# Patient Record
Sex: MALE | Race: WHITE | NOT HISPANIC OR LATINO | Employment: OTHER | URBAN - METROPOLITAN AREA
[De-identification: names, ages, dates, MRNs, and addresses within clinical notes are randomized per-mention and may not be internally consistent; named-entity substitution may affect disease eponyms.]

---

## 2020-03-07 ENCOUNTER — APPOINTMENT (OUTPATIENT)
Dept: RADIOLOGY | Facility: MEDICAL CENTER | Age: 80
DRG: 908 | End: 2020-03-07
Attending: HOSPITALIST
Payer: MEDICARE

## 2020-03-07 ENCOUNTER — APPOINTMENT (OUTPATIENT)
Dept: RADIOLOGY | Facility: MEDICAL CENTER | Age: 80
DRG: 908 | End: 2020-03-07
Attending: FAMILY MEDICINE
Payer: MEDICARE

## 2020-03-07 ENCOUNTER — HOSPITAL ENCOUNTER (OUTPATIENT)
Facility: MEDICAL CENTER | Age: 80
End: 2020-03-07

## 2020-03-07 ENCOUNTER — HOSPITAL ENCOUNTER (INPATIENT)
Facility: MEDICAL CENTER | Age: 80
LOS: 5 days | DRG: 908 | End: 2020-03-12
Attending: EMERGENCY MEDICINE | Admitting: HOSPITALIST
Payer: MEDICARE

## 2020-03-07 ENCOUNTER — HOSPITAL ENCOUNTER (OUTPATIENT)
Dept: RADIOLOGY | Facility: MEDICAL CENTER | Age: 80
End: 2020-03-07
Payer: MEDICARE

## 2020-03-07 DIAGNOSIS — R55 SYNCOPE, UNSPECIFIED SYNCOPE TYPE: ICD-10-CM

## 2020-03-07 DIAGNOSIS — K92.2 GASTROINTESTINAL HEMORRHAGE, UNSPECIFIED GASTROINTESTINAL HEMORRHAGE TYPE: ICD-10-CM

## 2020-03-07 PROBLEM — E66.09 CLASS 1 OBESITY DUE TO EXCESS CALORIES WITHOUT SERIOUS COMORBIDITY WITH BODY MASS INDEX (BMI) OF 30.0 TO 30.9 IN ADULT: Status: ACTIVE | Noted: 2020-03-07

## 2020-03-07 PROBLEM — E78.5 DYSLIPIDEMIA: Status: ACTIVE | Noted: 2020-03-07

## 2020-03-07 PROBLEM — N40.0 BPH (BENIGN PROSTATIC HYPERPLASIA): Status: ACTIVE | Noted: 2020-03-07

## 2020-03-07 PROBLEM — Z86.73 HISTORY OF CVA (CEREBROVASCULAR ACCIDENT): Status: ACTIVE | Noted: 2020-03-07

## 2020-03-07 PROBLEM — Z98.890 S/P COLONOSCOPIC POLYPECTOMY: Status: ACTIVE | Noted: 2020-03-07

## 2020-03-07 PROBLEM — I10 ESSENTIAL HYPERTENSION: Status: ACTIVE | Noted: 2020-03-07

## 2020-03-07 LAB
ABO + RH BLD: NORMAL
ABO GROUP BLD: NORMAL
ALBUMIN SERPL BCP-MCNC: 3.3 G/DL (ref 3.2–4.9)
ALBUMIN/GLOB SERPL: 1.4 G/DL
ALP SERPL-CCNC: 62 U/L (ref 30–99)
ALT SERPL-CCNC: 14 U/L (ref 2–50)
ANION GAP SERPL CALC-SCNC: 9 MMOL/L (ref 0–11.9)
AST SERPL-CCNC: 27 U/L (ref 12–45)
BARCODED ABORH UBTYP: 5100
BARCODED ABORH UBTYP: 5100
BARCODED ABORH UBTYP: 9500
BARCODED PRD CODE UBPRD: NORMAL
BARCODED UNIT NUM UBUNT: NORMAL
BASOPHILS # BLD AUTO: 0.3 % (ref 0–1.8)
BASOPHILS # BLD: 0.02 K/UL (ref 0–0.12)
BILIRUB SERPL-MCNC: 0.8 MG/DL (ref 0.1–1.5)
BLD GP AB SCN SERPL QL: NORMAL
BUN SERPL-MCNC: 20 MG/DL (ref 8–22)
CALCIUM SERPL-MCNC: 8.2 MG/DL (ref 8.5–10.5)
CHLORIDE SERPL-SCNC: 106 MMOL/L (ref 96–112)
CO2 SERPL-SCNC: 20 MMOL/L (ref 20–33)
COMPONENT R 8504R: NORMAL
CREAT SERPL-MCNC: 0.78 MG/DL (ref 0.5–1.4)
EOSINOPHIL # BLD AUTO: 0.02 K/UL (ref 0–0.51)
EOSINOPHIL NFR BLD: 0.3 % (ref 0–6.9)
ERYTHROCYTE [DISTWIDTH] IN BLOOD BY AUTOMATED COUNT: 44 FL (ref 35.9–50)
GLOBULIN SER CALC-MCNC: 2.3 G/DL (ref 1.9–3.5)
GLUCOSE BLD-MCNC: 169 MG/DL (ref 65–99)
GLUCOSE SERPL-MCNC: 112 MG/DL (ref 65–99)
HCT VFR BLD AUTO: 21.7 % (ref 42–52)
HCT VFR BLD AUTO: 28.7 % (ref 42–52)
HGB BLD-MCNC: 7.4 G/DL (ref 14–18)
HGB BLD-MCNC: 8.1 G/DL (ref 14–18)
HGB BLD-MCNC: 9.9 G/DL (ref 14–18)
IMM GRANULOCYTES # BLD AUTO: 0.02 K/UL (ref 0–0.11)
IMM GRANULOCYTES NFR BLD AUTO: 0.3 % (ref 0–0.9)
INR BLD: 1
LYMPHOCYTES # BLD AUTO: 1.39 K/UL (ref 1–4.8)
LYMPHOCYTES NFR BLD: 19.5 % (ref 22–41)
MCH RBC QN AUTO: 31.1 PG (ref 27–33)
MCHC RBC AUTO-ENTMCNC: 34.5 G/DL (ref 33.7–35.3)
MCV RBC AUTO: 90.3 FL (ref 81.4–97.8)
MONOCYTES # BLD AUTO: 0.47 K/UL (ref 0–0.85)
MONOCYTES NFR BLD AUTO: 6.6 % (ref 0–13.4)
NEUTROPHILS # BLD AUTO: 5.2 K/UL (ref 1.82–7.42)
NEUTROPHILS NFR BLD: 73 % (ref 44–72)
NRBC # BLD AUTO: 0 K/UL
NRBC BLD-RTO: 0 /100 WBC
PLATELET # BLD AUTO: 155 K/UL (ref 164–446)
PMV BLD AUTO: 9 FL (ref 9–12.9)
POTASSIUM SERPL-SCNC: 4.6 MMOL/L (ref 3.6–5.5)
PRODUCT TYPE UPROD: NORMAL
PROT SERPL-MCNC: 5.6 G/DL (ref 6–8.2)
RBC # BLD AUTO: 3.18 M/UL (ref 4.7–6.1)
RH BLD: NORMAL
SODIUM SERPL-SCNC: 135 MMOL/L (ref 135–145)
UNIT STATUS USTAT: NORMAL
WBC # BLD AUTO: 7.1 K/UL (ref 4.8–10.8)

## 2020-03-07 PROCEDURE — 770020 HCHG ROOM/CARE - TELE (206)

## 2020-03-07 PROCEDURE — 700111 HCHG RX REV CODE 636 W/ 250 OVERRIDE (IP)

## 2020-03-07 PROCEDURE — 04V63DZ RESTRICTION OF RIGHT COLIC ARTERY WITH INTRALUMINAL DEVICE, PERCUTANEOUS APPROACH: ICD-10-PCS | Performed by: RADIOLOGY

## 2020-03-07 PROCEDURE — 85018 HEMOGLOBIN: CPT | Mod: 91

## 2020-03-07 PROCEDURE — A9560 TC99M LABELED RBC: HCPCS

## 2020-03-07 PROCEDURE — 85610 PROTHROMBIN TIME: CPT

## 2020-03-07 PROCEDURE — 80053 COMPREHEN METABOLIC PANEL: CPT

## 2020-03-07 PROCEDURE — 700105 HCHG RX REV CODE 258: Performed by: HOSPITALIST

## 2020-03-07 PROCEDURE — 86901 BLOOD TYPING SEROLOGIC RH(D): CPT

## 2020-03-07 PROCEDURE — 99285 EMERGENCY DEPT VISIT HI MDM: CPT

## 2020-03-07 PROCEDURE — 75774 ARTERY X-RAY EACH VESSEL: CPT

## 2020-03-07 PROCEDURE — 30233N1 TRANSFUSION OF NONAUTOLOGOUS RED BLOOD CELLS INTO PERIPHERAL VEIN, PERCUTANEOUS APPROACH: ICD-10-PCS | Performed by: EMERGENCY MEDICINE

## 2020-03-07 PROCEDURE — 36415 COLL VENOUS BLD VENIPUNCTURE: CPT

## 2020-03-07 PROCEDURE — 82962 GLUCOSE BLOOD TEST: CPT

## 2020-03-07 PROCEDURE — 700102 HCHG RX REV CODE 250 W/ 637 OVERRIDE(OP): Performed by: HOSPITALIST

## 2020-03-07 PROCEDURE — 86900 BLOOD TYPING SEROLOGIC ABO: CPT

## 2020-03-07 PROCEDURE — B4141ZZ FLUOROSCOPY OF SUPERIOR MESENTERIC ARTERY USING LOW OSMOLAR CONTRAST: ICD-10-PCS | Performed by: RADIOLOGY

## 2020-03-07 PROCEDURE — 75726 ARTERY X-RAYS ABDOMEN: CPT | Mod: XU

## 2020-03-07 PROCEDURE — 85025 COMPLETE CBC W/AUTO DIFF WBC: CPT

## 2020-03-07 PROCEDURE — A9270 NON-COVERED ITEM OR SERVICE: HCPCS | Performed by: HOSPITALIST

## 2020-03-07 PROCEDURE — 86850 RBC ANTIBODY SCREEN: CPT

## 2020-03-07 PROCEDURE — 99223 1ST HOSP IP/OBS HIGH 75: CPT | Mod: AI | Performed by: HOSPITALIST

## 2020-03-07 PROCEDURE — 85014 HEMATOCRIT: CPT

## 2020-03-07 PROCEDURE — 700117 HCHG RX CONTRAST REV CODE 255: Performed by: HOSPITALIST

## 2020-03-07 PROCEDURE — 99153 MOD SED SAME PHYS/QHP EA: CPT

## 2020-03-07 RX ORDER — DOXAZOSIN 2 MG/1
2 TABLET ORAL EVERY EVENING
Status: DISCONTINUED | OUTPATIENT
Start: 2020-03-07 | End: 2020-03-12 | Stop reason: HOSPADM

## 2020-03-07 RX ORDER — ATORVASTATIN CALCIUM 10 MG/1
10 TABLET, FILM COATED ORAL DAILY
Status: DISCONTINUED | OUTPATIENT
Start: 2020-03-07 | End: 2020-03-12 | Stop reason: HOSPADM

## 2020-03-07 RX ORDER — ONDANSETRON 4 MG/1
4 TABLET, ORALLY DISINTEGRATING ORAL EVERY 4 HOURS PRN
Status: DISCONTINUED | OUTPATIENT
Start: 2020-03-07 | End: 2020-03-12 | Stop reason: HOSPADM

## 2020-03-07 RX ORDER — BISACODYL 10 MG
10 SUPPOSITORY, RECTAL RECTAL
Status: DISCONTINUED | OUTPATIENT
Start: 2020-03-07 | End: 2020-03-12 | Stop reason: HOSPADM

## 2020-03-07 RX ORDER — POLYETHYLENE GLYCOL 3350 17 G/17G
1 POWDER, FOR SOLUTION ORAL
Status: DISCONTINUED | OUTPATIENT
Start: 2020-03-07 | End: 2020-03-12 | Stop reason: HOSPADM

## 2020-03-07 RX ORDER — MIDAZOLAM HYDROCHLORIDE 1 MG/ML
INJECTION INTRAMUSCULAR; INTRAVENOUS
Status: COMPLETED
Start: 2020-03-07 | End: 2020-03-07

## 2020-03-07 RX ORDER — SODIUM CHLORIDE 9 MG/ML
500 INJECTION, SOLUTION INTRAVENOUS
Status: ACTIVE | OUTPATIENT
Start: 2020-03-07 | End: 2020-03-08

## 2020-03-07 RX ORDER — ACETAMINOPHEN 325 MG/1
650 TABLET ORAL EVERY 6 HOURS PRN
Status: DISCONTINUED | OUTPATIENT
Start: 2020-03-07 | End: 2020-03-12 | Stop reason: HOSPADM

## 2020-03-07 RX ORDER — OXYCODONE HYDROCHLORIDE 5 MG/1
5 TABLET ORAL
Status: DISCONTINUED | OUTPATIENT
Start: 2020-03-07 | End: 2020-03-12 | Stop reason: HOSPADM

## 2020-03-07 RX ORDER — ONDANSETRON 2 MG/ML
4 INJECTION INTRAMUSCULAR; INTRAVENOUS EVERY 4 HOURS PRN
Status: DISCONTINUED | OUTPATIENT
Start: 2020-03-07 | End: 2020-03-12 | Stop reason: HOSPADM

## 2020-03-07 RX ORDER — OXYCODONE HYDROCHLORIDE 5 MG/1
2.5 TABLET ORAL
Status: DISCONTINUED | OUTPATIENT
Start: 2020-03-07 | End: 2020-03-12 | Stop reason: HOSPADM

## 2020-03-07 RX ORDER — CHLORAL HYDRATE 500 MG
2000 CAPSULE ORAL EVERY MORNING
COMMUNITY

## 2020-03-07 RX ORDER — AMOXICILLIN 250 MG
2 CAPSULE ORAL 2 TIMES DAILY
Status: DISCONTINUED | OUTPATIENT
Start: 2020-03-07 | End: 2020-03-12 | Stop reason: HOSPADM

## 2020-03-07 RX ORDER — MIDAZOLAM HYDROCHLORIDE 1 MG/ML
.5-2 INJECTION INTRAMUSCULAR; INTRAVENOUS PRN
Status: ACTIVE | OUTPATIENT
Start: 2020-03-07 | End: 2020-03-08

## 2020-03-07 RX ORDER — ATORVASTATIN CALCIUM 10 MG/1
10 TABLET, FILM COATED ORAL EVERY MORNING
COMMUNITY

## 2020-03-07 RX ORDER — CLOPIDOGREL BISULFATE 75 MG/1
TABLET ORAL DAILY
Status: ON HOLD | COMMUNITY
End: 2020-03-11 | Stop reason: SDUPTHER

## 2020-03-07 RX ORDER — MORPHINE SULFATE 4 MG/ML
2 INJECTION, SOLUTION INTRAMUSCULAR; INTRAVENOUS
Status: DISCONTINUED | OUTPATIENT
Start: 2020-03-07 | End: 2020-03-12 | Stop reason: HOSPADM

## 2020-03-07 RX ORDER — ONDANSETRON 2 MG/ML
4 INJECTION INTRAMUSCULAR; INTRAVENOUS PRN
Status: ACTIVE | OUTPATIENT
Start: 2020-03-07 | End: 2020-03-08

## 2020-03-07 RX ORDER — ONDANSETRON 2 MG/ML
INJECTION INTRAMUSCULAR; INTRAVENOUS
Status: COMPLETED
Start: 2020-03-07 | End: 2020-03-07

## 2020-03-07 RX ORDER — SODIUM CHLORIDE, SODIUM LACTATE, POTASSIUM CHLORIDE, CALCIUM CHLORIDE 600; 310; 30; 20 MG/100ML; MG/100ML; MG/100ML; MG/100ML
INJECTION, SOLUTION INTRAVENOUS CONTINUOUS
Status: DISCONTINUED | OUTPATIENT
Start: 2020-03-07 | End: 2020-03-12 | Stop reason: HOSPADM

## 2020-03-07 RX ADMIN — IOHEXOL 45 ML: 300 INJECTION, SOLUTION INTRAVENOUS at 22:00

## 2020-03-07 RX ADMIN — MIDAZOLAM HYDROCHLORIDE 1 MG: 1 INJECTION INTRAMUSCULAR; INTRAVENOUS at 21:40

## 2020-03-07 RX ADMIN — SODIUM CHLORIDE, POTASSIUM CHLORIDE, SODIUM LACTATE AND CALCIUM CHLORIDE: 600; 310; 30; 20 INJECTION, SOLUTION INTRAVENOUS at 13:53

## 2020-03-07 RX ADMIN — MIDAZOLAM HYDROCHLORIDE 1 MG: 1 INJECTION, SOLUTION INTRAMUSCULAR; INTRAVENOUS at 21:40

## 2020-03-07 RX ADMIN — ONDANSETRON 4 MG: 2 INJECTION INTRAMUSCULAR; INTRAVENOUS at 21:38

## 2020-03-07 RX ADMIN — ATORVASTATIN CALCIUM 10 MG: 10 TABLET, FILM COATED ORAL at 08:49

## 2020-03-07 RX ADMIN — SODIUM CHLORIDE, POTASSIUM CHLORIDE, SODIUM LACTATE AND CALCIUM CHLORIDE: 600; 310; 30; 20 INJECTION, SOLUTION INTRAVENOUS at 07:03

## 2020-03-07 RX ADMIN — FENTANYL CITRATE 25 MCG: 50 INJECTION, SOLUTION INTRAMUSCULAR; INTRAVENOUS at 21:42

## 2020-03-07 ASSESSMENT — PATIENT HEALTH QUESTIONNAIRE - PHQ9
SUM OF ALL RESPONSES TO PHQ9 QUESTIONS 1 AND 2: 0
2. FEELING DOWN, DEPRESSED, IRRITABLE, OR HOPELESS: NOT AT ALL
1. LITTLE INTEREST OR PLEASURE IN DOING THINGS: NOT AT ALL

## 2020-03-07 ASSESSMENT — COPD QUESTIONNAIRES
DURING THE PAST 4 WEEKS HOW MUCH DID YOU FEEL SHORT OF BREATH: NONE/LITTLE OF THE TIME
COPD SCREENING SCORE: 2
HAVE YOU SMOKED AT LEAST 100 CIGARETTES IN YOUR ENTIRE LIFE: NO/DON'T KNOW
DO YOU EVER COUGH UP ANY MUCUS OR PHLEGM?: NO/ONLY WITH OCCASIONAL COLDS OR INFECTIONS
IN THE PAST 12 MONTHS DO YOU DO LESS THAN YOU USED TO BECAUSE OF YOUR BREATHING PROBLEMS: DISAGREE/UNSURE

## 2020-03-07 ASSESSMENT — COGNITIVE AND FUNCTIONAL STATUS - GENERAL
HELP NEEDED FOR BATHING: A LITTLE
TOILETING: A LITTLE
DAILY ACTIVITIY SCORE: 20
CLIMB 3 TO 5 STEPS WITH RAILING: TOTAL
DRESSING REGULAR UPPER BODY CLOTHING: A LITTLE
SUGGESTED CMS G CODE MODIFIER DAILY ACTIVITY: CJ
MOVING TO AND FROM BED TO CHAIR: A LITTLE
WALKING IN HOSPITAL ROOM: A LITTLE
SUGGESTED CMS G CODE MODIFIER MOBILITY: CK
STANDING UP FROM CHAIR USING ARMS: A LITTLE
MOBILITY SCORE: 17
DRESSING REGULAR LOWER BODY CLOTHING: A LITTLE
MOVING FROM LYING ON BACK TO SITTING ON SIDE OF FLAT BED: A LITTLE

## 2020-03-07 ASSESSMENT — LIFESTYLE VARIABLES
DOES PATIENT WANT TO STOP DRINKING: NO
TOTAL SCORE: 0
ON A TYPICAL DAY WHEN YOU DRINK ALCOHOL HOW MANY DRINKS DO YOU HAVE: 0
ALCOHOL_USE: NO
EVER FELT BAD OR GUILTY ABOUT YOUR DRINKING: NO
AVERAGE NUMBER OF DAYS PER WEEK YOU HAVE A DRINK CONTAINING ALCOHOL: 0
CONSUMPTION TOTAL: NEGATIVE
HAVE YOU EVER FELT YOU SHOULD CUT DOWN ON YOUR DRINKING: NO
TOTAL SCORE: 0
EVER HAD A DRINK FIRST THING IN THE MORNING TO STEADY YOUR NERVES TO GET RID OF A HANGOVER: NO
EVER_SMOKED: YES
HOW MANY TIMES IN THE PAST YEAR HAVE YOU HAD 5 OR MORE DRINKS IN A DAY: 0
HAVE PEOPLE ANNOYED YOU BY CRITICIZING YOUR DRINKING: NO
TOTAL SCORE: 0

## 2020-03-07 ASSESSMENT — ENCOUNTER SYMPTOMS
MUSCULOSKELETAL NEGATIVE: 1
EYES NEGATIVE: 1
RESPIRATORY NEGATIVE: 1
CONSTITUTIONAL NEGATIVE: 1
CARDIOVASCULAR NEGATIVE: 1
PSYCHIATRIC NEGATIVE: 1
BLOOD IN STOOL: 1
NEUROLOGICAL NEGATIVE: 1

## 2020-03-07 ASSESSMENT — FIBROSIS 4 INDEX: FIB4 SCORE: 3.63

## 2020-03-07 NOTE — ED NOTES
Patient resting comfortably on stretcher in no acute distress. He is aware that we are still waiting for a bed upstairs. Call bell within reach

## 2020-03-07 NOTE — H&P
Hospital Medicine History & Physical Note    Date of Service  3/7/2020    Primary Care Physician  Pcp Not In Computer    Consultants  None    Code Status  Full code     Chief Complaint  REctal bleed    History of Presenting Illness  80 y.o. male with history of dyslipidemia on statin therapy, essential hypertension which is now controlled after weight loss, and prior CVA on aspirin and Plavix, was in his usual state of health until 4 days prior to admission.  He had a scheduled colonoscopy, which went well, with removal of a polyp, however the next day had a small amount of bleeding.  Per his postoperative paperwork, this was a normal finding, so he did not think.  The following day, he had quite a bit more bleeding than the day prior, and on the day of just prior to admission he had a large amount of blood in the morning.  He denied any abdominal pain, and in fact ate as normal.  He did visit the emergency department, where he was not seen to be bleeding, and labs were taken.  He was then discharged home.  On the night prior to admission, while attempting to use the restroom, he suddenly collapsed to the floor without syncope, and then immediately after awakening, had a large bowel movement which was mostly blood.  He was subsequently taken back to the emergency department, where he apparently was found to have a hemoglobin drop of more than 3 points.  He was subsequently transferred to this facility for need of GI.  He denies headache or vision changes he has no chest pain or shortness of breath, no significant abdominal pain, no diarrhea or constipation.  No other complaints.    Review of Systems  Review of Systems   Constitutional: Negative.    HENT: Negative.    Eyes: Negative.    Respiratory: Negative.    Cardiovascular: Negative.    Gastrointestinal: Positive for blood in stool.   Genitourinary: Negative.    Musculoskeletal: Negative.    Skin: Negative.    Neurological: Negative.    Endo/Heme/Allergies:  Negative.    Psychiatric/Behavioral: Negative.        Past Medical History   has a past medical history of BPH (benign prostatic hyperplasia), Dyslipidemia, and Hypertension.    Surgical History   has a past surgical history that includes shoulder arthroplasty total; cataract phaco with iol (6/5/2012); and cataract phaco with iol (6/19/2012).     Family History  family history includes Cancer in his father and mother.     Social History   reports that he has never smoked. He does not have any smokeless tobacco history on file. He reports that he does not drink alcohol or use drugs.    Allergies  Allergies   Allergen Reactions   • Asa [Aspirin]      Gi bleed       Medications  Prior to Admission Medications   Prescriptions Last Dose Informant Patient Reported? Taking?   atorvastatin (LIPITOR) 10 MG Tab   Yes Yes   Sig: Take 10 mg by mouth every evening.   clopidogrel (PLAVIX) 75 MG Tab   Yes Yes   Sig: Take  by mouth every day.   doxazosin (CARDURA) 2 MG TABS   Yes No   Sig: Take 4 mg by mouth every evening.      Facility-Administered Medications: None       Physical Exam  Temp:  [36.1 °C (97 °F)] 36.1 °C (97 °F)  Pulse:  [78] 78  Resp:  [16] 16  BP: (121)/(63) 121/63  SpO2:  [96 %] 96 %    Physical Exam  Vitals signs and nursing note reviewed.   Constitutional:       General: He is not in acute distress.     Appearance: Normal appearance. He is not ill-appearing.   HENT:      Head: Normocephalic and atraumatic.      Nose: Nose normal.      Mouth/Throat:      Mouth: Mucous membranes are moist.      Pharynx: Oropharynx is clear. No oropharyngeal exudate or posterior oropharyngeal erythema.   Eyes:      Extraocular Movements: Extraocular movements intact.      Conjunctiva/sclera: Conjunctivae normal.      Pupils: Pupils are equal, round, and reactive to light.   Neck:      Musculoskeletal: Normal range of motion and neck supple. No muscular tenderness.   Cardiovascular:      Rate and Rhythm: Normal rate and regular  rhythm.      Pulses: Normal pulses.      Heart sounds: Normal heart sounds. No murmur. No friction rub. No gallop.    Pulmonary:      Effort: Pulmonary effort is normal. No respiratory distress.      Breath sounds: Normal breath sounds. No wheezing, rhonchi or rales.   Abdominal:      General: Abdomen is flat. Bowel sounds are normal. There is no distension.      Palpations: Abdomen is soft. There is no mass.      Tenderness: There is no abdominal tenderness.   Musculoskeletal: Normal range of motion.         General: No swelling or deformity.   Lymphadenopathy:      Cervical: No cervical adenopathy.   Skin:     General: Skin is warm and dry.      Findings: No lesion.   Neurological:      General: No focal deficit present.      Mental Status: He is alert and oriented to person, place, and time.      Cranial Nerves: No cranial nerve deficit.      Motor: No weakness.      Gait: Gait normal.   Psychiatric:         Mood and Affect: Mood normal.         Behavior: Behavior normal.         Laboratory:  Recent Labs     03/07/20  0502   WBC 7.1   RBC 3.18*   HEMOGLOBIN 9.9*   HEMATOCRIT 28.7*   MCV 90.3   MCH 31.1   MCHC 34.5   RDW 44.0   PLATELETCT 155*   MPV 9.0         No results for input(s): ALTSGPT, ASTSGOT, ALKPHOSPHAT, TBILIRUBIN, DBILIRUBIN, GAMMAGT, AMYLASE, LIPASE, ALB, PREALBUMIN, GLUCOSE in the last 72 hours.      No results for input(s): NTPROBNP in the last 72 hours.      No results for input(s): TROPONINT in the last 72 hours.    Urinalysis:    No results found     Imaging:  OUTSIDE IMAGES-CT ABDOMEN /PELVIS   Final Result            Assessment/Plan:  I anticipate this patient will require at least two midnights for appropriate medical management, necessitating inpatient admission.    * GIB (gastrointestinal bleeding)  Assessment & Plan  Suspect due to recent polypectomy, setting of ASA and plavix administration.  Post initiation of transfusion at outside facility.  Check H/H Q6H.  Will need GI consultation  if ongoing bleed.     History of CVA (cerebrovascular accident)  Assessment & Plan  On ASA and Plavix.      BPH (benign prostatic hyperplasia)  Assessment & Plan  Stable on medical regimen.  Monitor.     Class 1 obesity due to excess calories without serious comorbidity with body mass index (BMI) of 30.0 to 30.9 in adult  Assessment & Plan  Body mass index is 30.54 kg/m².      S/P colonoscopic polypectomy  Assessment & Plan  With associated mild bleed, now worsening.  Suspect due to ASA and Plavix.      Dyslipidemia  Assessment & Plan  Continue statin therapy.  Monitor.     Essential hypertension  Assessment & Plan  Controlled after weight loss.  Monitor.       VTE prophylaxis: SCD

## 2020-03-07 NOTE — ASSESSMENT & PLAN NOTE
Continue to have intermittent bleed.  Continue to have chronic blood loss anemia.  Monitor hemoglobin and hematocrit supportive care and transfuse if hemoglobin is less than 7 or hemodynamic instability

## 2020-03-07 NOTE — ED NOTES
Pharmacy Medication Reconciliation      Medication reconciliation updated and complete per pt at bedside  Allergies have been verified and updated   Pt home pharmacy:[facility]    Pt reports he did not take his Plavix for a few days prior to colonoscopy and recently restarted medication on Thursday 03/05/2020    Pt reports that he finished a 10 day course of Clindamycin on 02/29/2020

## 2020-03-07 NOTE — ASSESSMENT & PLAN NOTE
Pt was hypotensive this AM with systolic in the 60s   Started on IVF and also giving a 500 cc bolus  Now slowly improving

## 2020-03-07 NOTE — PROGRESS NOTES
Attending Hospitalist today is Dr. Rapp.  Please call this physician for orders, updates, or questions.

## 2020-03-07 NOTE — ED NOTES
This patient's attending physician will be Dr. Efren Lopez starting at 0700.  Please page him with updates/questions.

## 2020-03-07 NOTE — ASSESSMENT & PLAN NOTE
Patient is status post SMA embolization  Intermittent dark stool   Hemoglobin now has been stable   Transfuse if hemoglobin less than 7 or hemodynamic instability  GI signed off and will need colonoscopy as oupt.

## 2020-03-07 NOTE — ED TRIAGE NOTES
Chief Complaint   Patient presents with   • Bloody Stools     Pt presents as trasnfer from [facility]. Pt presented to the ED with complaitns of bloody stools following a colonoscopy. Pt Hgb was 12 on first assessment, followed by 9. Pt is currently receiving one unit of blood.

## 2020-03-07 NOTE — PROGRESS NOTES
Patient is seen and examined  Hemodynamically stable  Spoke with GI consultant Dr. Vance Moise   Recommended to have Bowel prep for tomorrow  Full liquid diet today  Clear liquid diet tomorrow  Dr. Moise will see patient  Patient's last dose of plavix yesterday   If hypotension or hemodynamically unstable or Hb<7 then transfuse  If acute instability then consider IR consult for embolization

## 2020-03-07 NOTE — ASSESSMENT & PLAN NOTE
Continue statin stop any further anticoagulation including Plavix for now till several weeks outpatient

## 2020-03-07 NOTE — PROGRESS NOTES
Received ED/ED transfer request from [place], [place].  Sending Physician: Marilu  Specialist consulted: NA  Diagnosis Rectal bleeding S/P colonoscopy on 3/4/2020.  Patient Accepted by: PADMINI

## 2020-03-07 NOTE — ED PROVIDER NOTES
ED Provider Note    CHIEF COMPLAINT  Chief Complaint   Patient presents with   • Bloody Stools     Pt presents as trasnfer from [facility]. Pt presented to the ED with complaitns of bloody stools following a colonoscopy. Pt Hgb was 12 on first assessment, followed by 9. Pt is currently receiving one unit of blood.        HPI  Asa Draper is a 80 y.o. male who presents to emergency room today transferred from [facility] after acute GI bleed with syncopal episode.  Patient had colonoscopy performed by Dr. Velásquez on 3/4/2020.  There was a polyp that was removed and had to be cauterized because of bleeding.  He had been taken off his Plavix restarted on Thursday the following day started having bleeding which increased with large amount on Friday.  He had gone to the bathroom and had syncopal episode and appear to be in severe distress secondary to his ongoing GI bleed and went to local emergency room and found to have markedly diminished hemoglobin dropping several points in requiring transfusion.  Denies chest pain no shortness of breath no fever or chills.  He was transferred from [facility] to higher level care severity of his medical condition.  Appear to be in severe distress as above.    REVIEW OF SYSTEMS  See HPI for further details. All other systems are negative.     PAST MEDICAL HISTORY  Past Medical History:   Diagnosis Date   • BPH (benign prostatic hyperplasia)    • Dyslipidemia    • Hypertension        FAMILY HISTORY  [unfilled]    SOCIAL HISTORY  Social History     Socioeconomic History   • Marital status:      Spouse name: Not on file   • Number of children: Not on file   • Years of education: Not on file   • Highest education level: Not on file   Occupational History   • Not on file   Social Needs   • Financial resource strain: Not on file   • Food insecurity     Worry: Not on file     Inability: Not on file   • Transportation needs     Medical: Not on file     Non-medical:  "Not on file   Tobacco Use   • Smoking status: Never Smoker   • Smokeless tobacco: Never Used   Substance and Sexual Activity   • Alcohol use: No   • Drug use: No   • Sexual activity: Not on file   Lifestyle   • Physical activity     Days per week: Not on file     Minutes per session: Not on file   • Stress: Not on file   Relationships   • Social connections     Talks on phone: Not on file     Gets together: Not on file     Attends Yazidism service: Not on file     Active member of club or organization: Not on file     Attends meetings of clubs or organizations: Not on file     Relationship status: Not on file   • Intimate partner violence     Fear of current or ex partner: Not on file     Emotionally abused: Not on file     Physically abused: Not on file     Forced sexual activity: Not on file   Other Topics Concern   • Not on file   Social History Narrative   • Not on file       SURGICAL HISTORY  Past Surgical History:   Procedure Laterality Date   • CATARACT PHACO WITH IOL  6/19/2012    Performed by [facility]EL LUCIO at [facility]   • CATARACT PHACO WITH IOL  6/5/2012    Performed by [facility]EL LUCIO at [facility]   • SHOULDER ARTHROPLASTY TOTAL         CURRENT MEDICATIONS  Home Medications     Reviewed by Clementina Davis R.N. (Registered Nurse) on 03/07/20 at 0455  Med List Status: Partial   Medication Last Dose Status   atorvastatin (LIPITOR) 10 MG Tab  Active   clopidogrel (PLAVIX) 75 MG Tab  Active   doxazosin (CARDURA) 2 MG TABS  Active                ALLERGIES  Allergies   Allergen Reactions   • Asa [Aspirin]      Gi bleed       PHYSICAL EXAM  VITAL SIGNS: /69   Pulse 79   Temp 36.1 °C (97 °F) (Temporal)   Resp 16   Ht 1.702 m (5' 7\")   Wt 88.5 kg (195 lb)   SpO2 96%   BMI 30.54 kg/m²       Constitutional: Well developed, Well nourished, severe distress, Non-toxic appearance.   HENT: Normocephalic, Atraumatic, Bilateral external ears normal, Oropharynx moist, No " oral exudates, Nose normal.   Eyes: PERRLA, EOMI, Conjunctiva normal, No discharge.   Neck: Normal range of motion, No tenderness, Supple, No stridor.   Lymphatic: No lymphadenopathy noted.   Cardiovascular: Normal heart rate, Normal rhythm, No murmurs, No rubs, No gallops.   Thorax & Lungs: Normal breath sounds, No respiratory distress, No wheezing, No chest tenderness.   Abdomen: Bowel sounds normal, Soft, No tenderness, No masses, No pulsatile masses.   Skin: Warm, Dry, No erythema, No rash.   Back: No tenderness, No CVA tenderness.      Rectal: Patient has dark bloody stool.  Extremities: Intact distal pulses, No edema, No tenderness, No cyanosis, No clubbing.   Musculoskeletal: Good range of motion in all major joints. No tenderness to palpation or major deformities noted.   Neurologic: Alert & oriented x 3, Normal motor function, Normal sensory function, No focal deficits noted.   Psychiatric: Affect normal, Judgment normal, Mood normal.     EKG;  Normal sinus rhythm 80 bpm, no ST elevation or depression, no widening of the QRS complex, good R wave progression, IA levels are normal.  This is a twelve-lead EKG is no previous EKG available at this time for comparison.    RADIOLOGY/PROCEDURES  OUTSIDE IMAGES-CT ABDOMEN /PELVIS   Final Result            COURSE & MEDICAL DECISION MAKING  Pertinent Labs & Imaging studies reviewed. (See chart for details)  Patient is anemic from his severe GI bleeding and had syncopal episode secondary to this he was transfused blood IV fluids.  Will be admitted to the hospital placed on cardiac monitor.  Severe distress secondary to the above and ongoing GI bleed.  Discussed case with hospitalist patient admitted as above please see orders for further plan.    FINAL IMPRESSION  1.  Acute gastrointestinal bleeding  2.  Syncopal episode second #1  3.  Severe anemia second #1  4.  Hypertension by history  5.  Critical care time of 39 minutes; this includes multiple conversations with  hospitalist, review records discussion treatment plan, interventions as discussed above.  This does not include any procedure.         Electronically signed by: Charan Walker D.O., 3/7/2020 6:47 AM

## 2020-03-08 LAB
ANION GAP SERPL CALC-SCNC: 6 MMOL/L (ref 0–11.9)
BASOPHILS # BLD AUTO: 0.1 % (ref 0–1.8)
BASOPHILS # BLD: 0.01 K/UL (ref 0–0.12)
BUN SERPL-MCNC: 22 MG/DL (ref 8–22)
CALCIUM SERPL-MCNC: 7.8 MG/DL (ref 8.5–10.5)
CHLORIDE SERPL-SCNC: 109 MMOL/L (ref 96–112)
CO2 SERPL-SCNC: 23 MMOL/L (ref 20–33)
CREAT SERPL-MCNC: 0.75 MG/DL (ref 0.5–1.4)
EOSINOPHIL # BLD AUTO: 0.01 K/UL (ref 0–0.51)
EOSINOPHIL NFR BLD: 0.1 % (ref 0–6.9)
ERYTHROCYTE [DISTWIDTH] IN BLOOD BY AUTOMATED COUNT: 46.4 FL (ref 35.9–50)
GLUCOSE SERPL-MCNC: 142 MG/DL (ref 65–99)
HCT VFR BLD AUTO: 20.6 % (ref 42–52)
HCT VFR BLD AUTO: 21.1 % (ref 42–52)
HGB BLD-MCNC: 6.9 G/DL (ref 14–18)
HGB BLD-MCNC: 6.9 G/DL (ref 14–18)
HGB BLD-MCNC: 7 G/DL (ref 14–18)
HGB BLD-MCNC: 7.4 G/DL (ref 14–18)
HGB BLD-MCNC: 7.6 G/DL (ref 14–18)
IMM GRANULOCYTES # BLD AUTO: 0.03 K/UL (ref 0–0.11)
IMM GRANULOCYTES NFR BLD AUTO: 0.3 % (ref 0–0.9)
LYMPHOCYTES # BLD AUTO: 1.07 K/UL (ref 1–4.8)
LYMPHOCYTES NFR BLD: 12.3 % (ref 22–41)
MCH RBC QN AUTO: 30.4 PG (ref 27–33)
MCHC RBC AUTO-ENTMCNC: 33.2 G/DL (ref 33.7–35.3)
MCV RBC AUTO: 91.7 FL (ref 81.4–97.8)
MONOCYTES # BLD AUTO: 0.61 K/UL (ref 0–0.85)
MONOCYTES NFR BLD AUTO: 7 % (ref 0–13.4)
NEUTROPHILS # BLD AUTO: 6.97 K/UL (ref 1.82–7.42)
NEUTROPHILS NFR BLD: 80.2 % (ref 44–72)
NRBC # BLD AUTO: 0 K/UL
NRBC BLD-RTO: 0 /100 WBC
PLATELET # BLD AUTO: 149 K/UL (ref 164–446)
PMV BLD AUTO: 9.1 FL (ref 9–12.9)
POTASSIUM SERPL-SCNC: 4.3 MMOL/L (ref 3.6–5.5)
RBC # BLD AUTO: 2.3 M/UL (ref 4.7–6.1)
SODIUM SERPL-SCNC: 138 MMOL/L (ref 135–145)
WBC # BLD AUTO: 8.7 K/UL (ref 4.8–10.8)

## 2020-03-08 PROCEDURE — 99232 SBSQ HOSP IP/OBS MODERATE 35: CPT | Performed by: FAMILY MEDICINE

## 2020-03-08 PROCEDURE — 80048 BASIC METABOLIC PNL TOTAL CA: CPT

## 2020-03-08 PROCEDURE — 700105 HCHG RX REV CODE 258: Performed by: HOSPITALIST

## 2020-03-08 PROCEDURE — A9270 NON-COVERED ITEM OR SERVICE: HCPCS | Performed by: HOSPITALIST

## 2020-03-08 PROCEDURE — 700105 HCHG RX REV CODE 258: Performed by: INTERNAL MEDICINE

## 2020-03-08 PROCEDURE — 85018 HEMOGLOBIN: CPT | Mod: 91

## 2020-03-08 PROCEDURE — 85014 HEMATOCRIT: CPT

## 2020-03-08 PROCEDURE — 770020 HCHG ROOM/CARE - TELE (206)

## 2020-03-08 PROCEDURE — P9016 RBC LEUKOCYTES REDUCED: HCPCS

## 2020-03-08 PROCEDURE — 86923 COMPATIBILITY TEST ELECTRIC: CPT | Mod: 91

## 2020-03-08 PROCEDURE — 36430 TRANSFUSION BLD/BLD COMPNT: CPT

## 2020-03-08 PROCEDURE — 700102 HCHG RX REV CODE 250 W/ 637 OVERRIDE(OP): Performed by: HOSPITALIST

## 2020-03-08 PROCEDURE — 85025 COMPLETE CBC W/AUTO DIFF WBC: CPT

## 2020-03-08 PROCEDURE — 36415 COLL VENOUS BLD VENIPUNCTURE: CPT

## 2020-03-08 RX ORDER — SODIUM CHLORIDE 9 MG/ML
INJECTION, SOLUTION INTRAVENOUS CONTINUOUS
Status: ACTIVE | OUTPATIENT
Start: 2020-03-08 | End: 2020-03-09

## 2020-03-08 RX ADMIN — ATORVASTATIN CALCIUM 10 MG: 10 TABLET, FILM COATED ORAL at 04:58

## 2020-03-08 RX ADMIN — SODIUM CHLORIDE: 9 INJECTION, SOLUTION INTRAVENOUS at 23:23

## 2020-03-08 RX ADMIN — SODIUM CHLORIDE, POTASSIUM CHLORIDE, SODIUM LACTATE AND CALCIUM CHLORIDE: 600; 310; 30; 20 INJECTION, SOLUTION INTRAVENOUS at 12:47

## 2020-03-08 ASSESSMENT — PATIENT HEALTH QUESTIONNAIRE - PHQ9
1. LITTLE INTEREST OR PLEASURE IN DOING THINGS: NOT AT ALL
2. FEELING DOWN, DEPRESSED, IRRITABLE, OR HOPELESS: NOT AT ALL
SUM OF ALL RESPONSES TO PHQ9 QUESTIONS 1 AND 2: 0

## 2020-03-08 ASSESSMENT — ENCOUNTER SYMPTOMS
PALPITATIONS: 0
PSYCHIATRIC NEGATIVE: 1
EYES NEGATIVE: 1
RESPIRATORY NEGATIVE: 1
NEUROLOGICAL NEGATIVE: 1
CHILLS: 0
PHOTOPHOBIA: 0
MUSCULOSKELETAL NEGATIVE: 1
BLOOD IN STOOL: 1
FEVER: 0
DOUBLE VISION: 0
BLURRED VISION: 0
CARDIOVASCULAR NEGATIVE: 1

## 2020-03-08 NOTE — OR SURGEON
Immediate Post- Operative Note        PostOp Diagnosis: cecal bleeding      Procedure(s): arteriogram with coil embolization of distal RIGHT colic branch of SMA    Tammy Ferreira      Estimated Blood Loss: Less than 5 ml        Complications: None            3/7/2020     10:29 PM     Devan Madera M.D.

## 2020-03-08 NOTE — CONSULTS
Date of Consultation:  3/7/2020    Patient: : Asa Draper  MRN: [id number]    Referring Physician:  Huber Del Valle M.D.    GI:Vance Moise M.D.     Reason for Consultation: Rectal bleeding     History of Present Illness:      80 y.o. male with history of prior CVA on aspirin and Plavix, was in his usual state of health until 4 days prior to admission.    He had a colonoscopy on 3/4/20 :     A single flat 35 mm polyp of benign appearance was found in the ascending colon. A piece-meal polypectomy was performed using a hot snare over a saline pillow. The polyp was completely removed.     He resumed his antiplatelets on the day of the colonoscopy and started having some rectal bleeding, since the day after the colonoscopy. He had a syncopal events yesterday followed by a large bloody bowel movement. In the ER his hb seemed to be considerably low from prior         8/3/2011 03:53 3/7/2020 05:02 3/7/2020 14:47   Hemoglobin 12.5 (L) 9.9 (L) 8.1 (L)   Hematocrit 36.2 (L) 28.7 (L)          he underwent arteriogram with coil embolization of distal RIGHT colic branch of SMA  On 3/7/20     3/8/20: No BM yet. No abd pain . Off plavix      Past Medical History:   Diagnosis Date   • BPH (benign prostatic hyperplasia)    • Dyslipidemia    • Hypertension          Past Surgical History:   Procedure Laterality Date   • CATARACT PHACO WITH IOL  6/19/2012    Performed by ARIEL LUCIO at [facility]   • CATARACT PHACO WITH IOL  6/5/2012    Performed by [facility] LUCIO at [facility]   • SHOULDER ARTHROPLASTY TOTAL         Family History   Problem Relation Age of Onset   • Cancer Mother    • Cancer Father        Social History     Socioeconomic History   • Marital status:      Spouse name: Not on file   • Number of children: Not on file   • Years of education: Not on file   • Highest education level: Not on file   Occupational History   • Not on file   Social Needs   • Financial  resource strain: Not on file   • Food insecurity     Worry: Not on file     Inability: Not on file   • Transportation needs     Medical: Not on file     Non-medical: Not on file   Tobacco Use   • Smoking status: Never Smoker   • Smokeless tobacco: Never Used   Substance and Sexual Activity   • Alcohol use: No   • Drug use: No   • Sexual activity: Not on file   Lifestyle   • Physical activity     Days per week: Not on file     Minutes per session: Not on file   • Stress: Not on file   Relationships   • Social connections     Talks on phone: Not on file     Gets together: Not on file     Attends Pentecostalism service: Not on file     Active member of club or organization: Not on file     Attends meetings of clubs or organizations: Not on file     Relationship status: Not on file   • Intimate partner violence     Fear of current or ex partner: Not on file     Emotionally abused: Not on file     Physically abused: Not on file     Forced sexual activity: Not on file   Other Topics Concern   • Not on file   Social History Narrative   • Not on file       Review of Systems   Constitutional: Positive for malaise/fatigue.   HENT: Negative.    Eyes: Negative.    Respiratory: Negative.    Cardiovascular: Negative.    Gastrointestinal: Positive for blood in stool.   Musculoskeletal: Negative.    Skin: Negative.    Neurological: Negative.    Endo/Heme/Allergies: Negative.    Psychiatric/Behavioral: Negative.          Physical Exam:  Vitals:    03/07/20 0701 03/07/20 0801 03/07/20 1200 03/07/20 1300   BP: (!) 99/61 102/65 (!) 97/62    Pulse: 79 69 77    Resp: 16 (!) 28 18    Temp:   36.8 °C (98.2 °F)    TempSrc:   Temporal    SpO2: 96% 94% 96%    Weight:    87.1 kg (192 lb 0.3 oz)   Height:           Physical Exam   Constitutional: He is oriented to person, place, and time and well-developed, well-nourished, and in no distress.   HENT:   Head: Atraumatic.   Eyes: Pupils are equal, round, and reactive to light.   Pale conjunctiva     Neck: Normal range of motion. Neck supple.   Cardiovascular: Normal rate and regular rhythm.   Pulmonary/Chest: Effort normal and breath sounds normal.   Abdominal: Soft. Bowel sounds are normal.   Musculoskeletal: Normal range of motion.   Neurological: He is alert and oriented to person, place, and time.         Labs:  Recent Labs     03/07/20  0502 03/07/20  1447   WBC 7.1  --    RBC 3.18*  --    HEMOGLOBIN 9.9* 8.1*   HEMATOCRIT 28.7*  --    MCV 90.3  --    MCH 31.1  --    MCHC 34.5  --    RDW 44.0  --    PLATELETCT 155*  --    MPV 9.0  --      Recent Labs     03/07/20  0502   SODIUM 135   POTASSIUM 4.6   CHLORIDE 106   CO2 20   GLUCOSE 112*   BUN 20             Imaging:  Bleeding scan : 3/7/20     IMPRESSION:    1.  Findings are consistent with active GI bleeding within the large bowel at the level of the cecum.      Impressions:    1. Lower GI bleed   2. Colon polyps   3.H/o EMR at      Recommendations:     1. Advance diet as tolerated   2. Trend Hb/Hct ( His hb is stable at 7-7.5gm% , he may benefit from a unit of PRBC )   3. If his hb stable without any overt bleeding he can be Dced home  4.Hold Plavix for 7-10 days, and follow with PCP to re-evaluate the need for plavix   5.As per the patient he is on Plavix for h/o stroke ( 7/18/2019)         This note was generated using voice recognition software which has a small chance of producing errors of grammar and possibly content. I have made every reasonable attempt to find and correct any obvious errors, but expect that some may not be found prior to finalization of this note.

## 2020-03-08 NOTE — PROGRESS NOTES
Patient returned from IR.  Alert and oriented x4, able to answer questions and follow commands.  Right groin puncture site, clean dry and in tact.  Pt instructed to lay flat post operatively for 2 hours.  Pt compliant.     Updated Dr. Ferreira upon return to floor - patient to remain NPO until further notice.  Be attentive to pain mgmt due to risk of necrotic bowel.  RN to call physician back if pain occurs.  No bowel prep needed at this time.

## 2020-03-08 NOTE — CARE PLAN
Problem: Communication  Goal: The ability to communicate needs accurately and effectively will improve  Outcome: PROGRESSING AS EXPECTED  Intervention: Educate patient and significant other/support system about the plan of care, procedures, treatments, medications and allow for questions  Note: Here for active GIB. Plan is for Bowel Prep tomorrow. Colonoscopy Monday. Emergent Embolization if needed before then. Patient demonstrating good understanding.     Problem: Safety  Goal: Will remain free from falls  Outcome: PROGRESSING SLOWER THAN EXPECTED  Intervention: Assess risk factors for falls  Note: Here for active GIB. Patient assisted to floor when ambulating to bathroom with LISA Reed. Vital signs obtained. BP: 71/40. Patient put back to bed and Trendelenburged. Post fall management deferred to night shift. Patient verbalizing feeling dizzy, but otherwise stable.

## 2020-03-08 NOTE — CARE PLAN
Problem: Venous Thromboembolism (VTW)/Deep Vein Thrombosis (DVT) Prevention:  Goal: Patient will participate in Venous Thrombosis (VTE)/Deep Vein Thrombosis (DVT)Prevention Measures  Intervention: Encourage patient to perform ankle flex, foot rotation, and knee flex exercises in addition to other prophylatic measures every hour while awake  Note: SCD in use for dvt prophylaxis, encouraged pt to perform ankle flex and knee flex exercises, pt acknowledged.     Problem: Pain Management  Goal: Pain level will decrease to patient's comfort goal  Intervention: Follow pain managment plan developed in collaboration with patient and Interdisciplinary Team  Note: Pain assessed, pt declined, resting and reposition implemented, pt tolerated well.

## 2020-03-08 NOTE — PROGRESS NOTES
Repeat hgb and hct to be drawn at midnight.   said to follow protocol for transfusion - awaiting labs to be drawn.

## 2020-03-08 NOTE — PROGRESS NOTES
OVERNIGHT HOSPITALIST:    Received critical results for the NM-GI bleeding scan:    IMPRESSION:     1.  Findings are consistent with active GI bleeding within the large bowel at the level of the cecum.      -Ordered STAT H&H and Hemoglobin is now 7.4  -Paged GI and discussed results with Dr. Moise over the phone. He is recommending blood transfusion and to refer to IR for possible embolization due to active bleeding  -Paged IR and discussed with IR on call Dr. Madera  -Nurse was updated about the plan

## 2020-03-08 NOTE — PROGRESS NOTES
Case reviewed with the hospital ist on call   Active oozing from prior polypectomy site in the AC   Hb: 7.4, with labile BP   Suggest IR embolization

## 2020-03-08 NOTE — CARE PLAN
Problem: Safety  Goal: Will remain free from falls  Outcome: PROGRESSING AS EXPECTED    -Pt bedrest currently following IR procedure. Educated to ring for assistance.     Problem: Pain Management  Goal: Pain level will decrease to patient's comfort goal  Outcome: PROGRESSING AS EXPECTED    -Pt has no c/o pain. Resting comfortably in bed.

## 2020-03-08 NOTE — PROGRESS NOTES
Monitor Summary: SR 96, NH .22, QRS .08, QT .32 with First Degree Heart Block per strip from monitor room.

## 2020-03-08 NOTE — PROGRESS NOTES
Mesenteric arteriogram with embolization of GDA performed by Dr Madera via right femoral access. Procedure BARs explained to patient by physician and consent obtained. Patient to IR 2 and assisted to table. Patient was monitored and assessed continuously throughout procedure; ETCO2 27/30 with consistent waveform. Procedure completed without complication. Right groin  puncture closed with angioseal and site CDI; sterile guaze/tegaderm dressing applied. Patient tolerated procedure quite well, was slightly drowsy but appropriately responsive afterward. Patient returned to his room in good condition.     [facility]F vascular closure ref [id number]  Lot [id number]

## 2020-03-08 NOTE — PROGRESS NOTES
[facility] Medicine Daily Progress Note    Date of Service  3/8/2020    Chief Complaint  80 y.o. male admitted 3/7/2020 with GI bleed    Hospital Course    Patient is a 80-year-old male with prior history of stroke on antiplatelet therapy with Plavix who underwent for outpatient elective colonoscopy on 3/4/2020 done by Dr. Velásquez, and continued taking Plavix on subsequent following days admitted here for GI bleed and anemia.  Patient continued to have GI bleeding and hemoglobin drop from 9.9 on admission to 7.7 with hypotension and active GI bleed.  Patient required 1 unit of transfusion.  Patient underwent for emergency IR angiogram and underwent for embolization of SMA.  After embolization patient has stable post procedure course and does not have any more bleeding.      Interval Problem Update  Patient denies any bowel movement or active bleeding.  Patient states that he feels weak and tired but symptoms are stable not worsening.  Weakness and tiredness has been going on for the last 48 hours.  Weakness was worse due to active GI bleed.    Consultants/Specialty  Interventional radiology- Dr. Madera  Gastroenterology - Dr. Moise     Code Status  Full code    Disposition  Home in 1-2 days     Review of Systems  Review of Systems   Constitutional: Negative for chills and fever.   HENT: Negative for hearing loss and tinnitus.    Eyes: Negative for blurred vision, double vision and photophobia.   Cardiovascular: Negative for chest pain and palpitations.   Genitourinary: Negative for dysuria, frequency and urgency.   Skin: Negative for itching.   All other systems reviewed and are negative.       Physical Exam  Temp:  [36.6 °C (97.9 °F)-37.1 °C (98.7 °F)] 36.6 °C (97.9 °F)  Pulse:  [77-97] 81  Resp:  [7-19] 16  BP: ()/(40-68) 107/66  SpO2:  [88 %-98 %] 97 %    Physical Exam  Constitutional:       General: He is not in acute distress.     Appearance: Normal appearance. He is not ill-appearing, toxic-appearing or  diaphoretic.   HENT:      Head: Normocephalic.      Nose: Nose normal. No congestion or rhinorrhea.      Mouth/Throat:      Pharynx: No oropharyngeal exudate or posterior oropharyngeal erythema.   Eyes:      General: No scleral icterus.     Pupils: Pupils are equal, round, and reactive to light.   Neck:      Musculoskeletal: Normal range of motion. No neck rigidity or muscular tenderness.      Vascular: No carotid bruit.   Cardiovascular:      Rate and Rhythm: Normal rate and regular rhythm.      Heart sounds: No murmur. No friction rub.   Pulmonary:      Effort: Pulmonary effort is normal. No respiratory distress.      Breath sounds: Normal breath sounds. No stridor. No wheezing or rhonchi.   Abdominal:      General: Abdomen is flat. There is no distension.      Palpations: Abdomen is soft. There is no mass.      Tenderness: There is no abdominal tenderness.      Hernia: No hernia is present.   Musculoskeletal:         General: No swelling, tenderness, deformity or signs of injury.   Lymphadenopathy:      Cervical: No cervical adenopathy.   Skin:     General: Skin is warm.   Neurological:      General: No focal deficit present.      Mental Status: He is alert and oriented to person, place, and time. Mental status is at baseline.      Sensory: No sensory deficit.      Coordination: Coordination normal.         Fluids    Intake/Output Summary (Last 24 hours) at 3/8/2020 1128  Last data filed at 3/8/2020 0000  Gross per 24 hour   Intake --   Output 50 ml   Net -50 ml       Laboratory  Recent Labs     03/07/20  0502  03/07/20  1950 03/08/20  0145 03/08/20  0729   WBC 7.1  --   --  8.7  --    RBC 3.18*  --   --  2.30*  --    HEMOGLOBIN 9.9*   < > 7.4* 7.0* 7.6*   HEMATOCRIT 28.7*  --  21.7* 21.1*  --    MCV 90.3  --   --  91.7  --    MCH 31.1  --   --  30.4  --    MCHC 34.5  --   --  33.2*  --    RDW 44.0  --   --  46.4  --    PLATELETCT 155*  --   --  149*  --    MPV 9.0  --   --  9.1  --     < > = values in this  interval not displayed.     Recent Labs     03/07/20  0502 03/08/20  0145   SODIUM 135 138   POTASSIUM 4.6 4.3   CHLORIDE 106 109   CO2 20 23   GLUCOSE 112* 142*   BUN 20 22   CREATININE 0.78 0.75   CALCIUM 8.2* 7.8*                   Imaging  NM-GI BLEEDING SCAN   Final Result      1.  Findings are consistent with active GI bleeding within the large bowel at the level of the cecum.      These findings were discussed with nurse garrick on 03/07/2020.      OUTSIDE IMAGES-CT ABDOMEN /PELVIS   Final Result      IR-VISCERAL ANGIOGRAM - SELECTIVE    (Results Pending)        Assessment/Plan  * GIB (gastrointestinal bleeding)  Assessment & Plan  Patient is status post SMA embolization for emergency GI bleeding overnight.  Stable hemoglobin after 1 unit  Will start patient on full liquid diet  Monitor hemoglobin and hematocrit every 6 hours  Discussed with patient's GI consultant and interventional radiologist  Appreciate GI consult and recommendations.    History of CVA (cerebrovascular accident)  Assessment & Plan  Continue statin stop any further anticoagulation including Plavix for now till several weeks outpatient    BPH (benign prostatic hyperplasia)  Assessment & Plan  Stable on medical regimen.  Monitor.     S/P colonoscopic polypectomy  Assessment & Plan  Discontinue any blood thinners.  Patient has a prior history of stroke.  Considering patient has hypotension and severe GI bleed I am concerned that risk of bleeding are higher  And the polyp pathology diagnosis is pending as of now.  Risk-benefit analysis was done with patient and gastroenterology consultants and it is concluded that patient should be off from Plavix until further recommendations are made in outpatient setting    Dyslipidemia  Assessment & Plan  Continue statin therapy.  Monitor.     Essential hypertension  Assessment & Plan  Patient is actually hypotensive after GI bleed and currently blood pressures are stable.  Avoid any blood pressure  medications for now continue to monitor       VTE prophylaxis: SCDs only, pharmacological anticoagulation is contraindicated

## 2020-03-08 NOTE — PROGRESS NOTES
Radiologist Dr. Chong called RN during shift change, RN did state that she has not received report on the patient yet.  Dr. Chong proceeded to tell RN that the patient has an acute GI bleed from the cecum.  Dr. Ferreira paged.     Dr. Chong from radiology called with critical result of active GI bleed from the cecum. Critical lab result read back to Dr. Chong.  Dr. Ferreira notified of critical lab result at 1925.  Critical radiology result read back by Dr. Ferreira.

## 2020-03-09 ENCOUNTER — APPOINTMENT (OUTPATIENT)
Dept: CARDIOLOGY | Facility: MEDICAL CENTER | Age: 80
DRG: 908 | End: 2020-03-09
Attending: INTERNAL MEDICINE
Payer: MEDICARE

## 2020-03-09 ENCOUNTER — TELEPHONE (OUTPATIENT)
Dept: CARDIOLOGY | Facility: MEDICAL CENTER | Age: 80
End: 2020-03-09

## 2020-03-09 DIAGNOSIS — R00.2 PALPITATIONS: ICD-10-CM

## 2020-03-09 DIAGNOSIS — I49.1 PAC (PREMATURE ATRIAL CONTRACTION): Chronic | ICD-10-CM

## 2020-03-09 PROBLEM — R00.1 SINUS BRADYCARDIA: Status: ACTIVE | Noted: 2020-03-09

## 2020-03-09 LAB
EKG IMPRESSION: NORMAL
HCT VFR BLD AUTO: 22.9 % (ref 42–52)
HGB BLD-MCNC: 6.9 G/DL (ref 14–18)
HGB BLD-MCNC: 7 G/DL (ref 14–18)
HGB BLD-MCNC: 7.7 G/DL (ref 14–18)
HGB BLD-MCNC: 8.2 G/DL (ref 14–18)
LV EJECT FRACT  99904: 60
LV EJECT FRACT MOD 2C 99903: 66.62
LV EJECT FRACT MOD 4C 99902: 60.43
LV EJECT FRACT MOD BP 99901: 62.16

## 2020-03-09 PROCEDURE — 85014 HEMATOCRIT: CPT

## 2020-03-09 PROCEDURE — 700102 HCHG RX REV CODE 250 W/ 637 OVERRIDE(OP): Performed by: HOSPITALIST

## 2020-03-09 PROCEDURE — 85018 HEMOGLOBIN: CPT

## 2020-03-09 PROCEDURE — 99233 SBSQ HOSP IP/OBS HIGH 50: CPT | Performed by: FAMILY MEDICINE

## 2020-03-09 PROCEDURE — P9016 RBC LEUKOCYTES REDUCED: HCPCS

## 2020-03-09 PROCEDURE — 99223 1ST HOSP IP/OBS HIGH 75: CPT | Performed by: INTERNAL MEDICINE

## 2020-03-09 PROCEDURE — 93306 TTE W/DOPPLER COMPLETE: CPT

## 2020-03-09 PROCEDURE — A9270 NON-COVERED ITEM OR SERVICE: HCPCS | Performed by: HOSPITALIST

## 2020-03-09 PROCEDURE — 93306 TTE W/DOPPLER COMPLETE: CPT | Mod: 26 | Performed by: INTERNAL MEDICINE

## 2020-03-09 PROCEDURE — 36430 TRANSFUSION BLD/BLD COMPNT: CPT

## 2020-03-09 PROCEDURE — 770020 HCHG ROOM/CARE - TELE (206)

## 2020-03-09 PROCEDURE — 36415 COLL VENOUS BLD VENIPUNCTURE: CPT

## 2020-03-09 PROCEDURE — 93010 ELECTROCARDIOGRAM REPORT: CPT | Performed by: INTERNAL MEDICINE

## 2020-03-09 PROCEDURE — 93005 ELECTROCARDIOGRAM TRACING: CPT | Performed by: FAMILY MEDICINE

## 2020-03-09 PROCEDURE — 86923 COMPATIBILITY TEST ELECTRIC: CPT

## 2020-03-09 RX ADMIN — ACETAMINOPHEN 650 MG: 325 TABLET, FILM COATED ORAL at 22:27

## 2020-03-09 RX ADMIN — ATORVASTATIN CALCIUM 10 MG: 10 TABLET, FILM COATED ORAL at 04:06

## 2020-03-09 ASSESSMENT — ENCOUNTER SYMPTOMS
ORTHOPNEA: 0
CHILLS: 0
HEADACHES: 1
PALPITATIONS: 0
PHOTOPHOBIA: 0
DOUBLE VISION: 0
HEADACHES: 0
FEVER: 0
BLURRED VISION: 0
DIZZINESS: 0

## 2020-03-09 ASSESSMENT — PATIENT HEALTH QUESTIONNAIRE - PHQ9
2. FEELING DOWN, DEPRESSED, IRRITABLE, OR HOPELESS: NOT AT ALL
1. LITTLE INTEREST OR PLEASURE IN DOING THINGS: NOT AT ALL
SUM OF ALL RESPONSES TO PHQ9 QUESTIONS 1 AND 2: 0

## 2020-03-09 NOTE — PROGRESS NOTES
Gastroenterology Progress Note     Author: Valeriy Velásquez M.D.   Date & Time Created: 3/9/2020 10:29 AM    Chief Complaint:  Gi bleeding    Interval History:  Patient with post-polypectomy bleeding from very large cecal polyp removal now s/p IR embolization with seeming control of bleeding. However this am has hypotension but no bowel movements. HgBs appear stable but is getting one unit of blood now. No abd pain no CP or lightheadedness.    Review of Systems:  Review of Systems   Constitutional: Positive for malaise/fatigue. Negative for chills and fever.   Cardiovascular: Negative for chest pain.   Neurological: Positive for headaches.       Physical Exam:  Physical Exam  Constitutional:       Appearance: Normal appearance. He is not ill-appearing.   HENT:      Head: Normocephalic and atraumatic.      Mouth/Throat:      Mouth: Mucous membranes are moist.   Eyes:      General: No scleral icterus.     Extraocular Movements: Extraocular movements intact.   Cardiovascular:      Rate and Rhythm: Normal rate and regular rhythm.   Pulmonary:      Breath sounds: Normal breath sounds.   Abdominal:      General: Abdomen is flat.      Palpations: Abdomen is soft.      Tenderness: There is no guarding or rebound.   Musculoskeletal:         General: No swelling.   Skin:     General: Skin is warm and dry.   Neurological:      Mental Status: He is alert.   Psychiatric:         Mood and Affect: Mood normal.         Behavior: Behavior normal.         Labs:          Recent Labs     03/07/20  0502 03/08/20  0145   SODIUM 135 138   POTASSIUM 4.6 4.3   CHLORIDE 106 109   CO2 20 23   BUN 20 22   CREATININE 0.78 0.75   CALCIUM 8.2* 7.8*     Recent Labs     03/07/20  0502 03/08/20  0145   ALTSGPT 14  --    ASTSGOT 27  --    ALKPHOSPHAT 62  --    TBILIRUBIN 0.8  --    GLUCOSE 112* 142*     Recent Labs     03/07/20  0502  03/07/20  1950 03/08/20  0145  03/08/20  1939 03/08/20  2215 03/09/20  0536 03/09/20  0755   RBC 3.18*  --   --   2.30*  --   --   --   --   --    HEMOGLOBIN 9.9*   < > 7.4* 7.0*   < > 6.9* 6.9* 6.9* 7.0*   HEMATOCRIT 28.7*  --  21.7* 21.1*  --  20.6*  --   --   --    PLATELETCT 155*  --   --  149*  --   --   --   --   --     < > = values in this interval not displayed.     Recent Labs     20  0502 20  0145   WBC 7.1 8.7   NEUTSPOLYS 73.00* 80.20*   LYMPHOCYTES 19.50* 12.30*   MONOCYTES 6.60 7.00   EOSINOPHILS 0.30 0.10   BASOPHILS 0.30 0.10   ASTSGOT 27  --    ALTSGPT 14  --    ALKPHOSPHAT 62  --    TBILIRUBIN 0.8  --      Hemodynamics:  Temp (24hrs), Av.9 °C (98.5 °F), Min:36.4 °C (97.6 °F), Max:37.5 °C (99.5 °F)  Temperature: 36.8 °C (98.2 °F)  Pulse  Av.5  Min: 58  Max: 97   Blood Pressure : (!) 98/57     Respiratory:    Respiration: 17, Pulse Oximetry: 97 %        RUL Breath Sounds: Clear, RML Breath Sounds: Clear, RLL Breath Sounds: Diminished, PRABHU Breath Sounds: Clear, LLL Breath Sounds: Diminished  Fluids:    Intake/Output Summary (Last 24 hours) at 3/9/2020 1029  Last data filed at 3/9/2020 0800  Gross per 24 hour   Intake 1575 ml   Output 2050 ml   Net -475 ml        GI/Nutrition:  Orders Placed This Encounter   Procedures   • Diet Order Clear Liquid (no red)     Standing Status:   Standing     Number of Occurrences:   1     Order Specific Question:   Diet:     Answer:   Clear Liquid [10]     Comments:   no red     Medical Decision Making, by Problem:  Active Hospital Problems    Diagnosis   • *GIB (gastrointestinal bleeding) [K92.2]   • Essential hypertension [I10]   • Dyslipidemia [E78.5]   • S/P colonoscopic polypectomy [Z98.890]   • History of CVA (cerebrovascular accident) [Z86.73]     Impression and Plan    1) Lower GI bleeding - From postpolypectomy site. Now with some hypotension and concern for recurrent bleeding. Will check HgBs q6 after transfusion but I think he can have clear liquids for now. Discussed with him options from here if shows signs of rebleeding (HgBs blood in stool etc)  which would include prepping for colonoscopy vs repeat IR embolization vs surgical resection. Reviewed him that given the size and high risk nature of this polyp proceeding to surgical resection is not unreasonable as he will at least need one or 2 repeat colonoscopies in the coming year to confirm removal of this very large polyp given piecemeal resection and surgical removal may still be needed in the coming year. Will discuss further with him if needed.  2) Anemia, acute  3) Hypotension. Says his BP's tend to run low.  4) Anticoagulation therapy - hold anticoagulants for now.    Will follow    EMO      Quality-Core Measures

## 2020-03-09 NOTE — PROGRESS NOTES
Monitor tech notified pt experienced HR 48 for 2 min and the 2.8 sec pause. Updated to MD. Rapp. New order received: ECG

## 2020-03-09 NOTE — PROGRESS NOTES
HGB 6.9, pt asymptomatic. MD notified. Order received to transfuse I unit PRBCs, see blood administration charting for details.

## 2020-03-09 NOTE — ASSESSMENT & PLAN NOTE
First-degree AV block  Frequent pauses with the highest 3.5 sec asymptomatic  Followed by cardiology appreciate rec.  Cardiology will arrange for zio patch as outpt

## 2020-03-09 NOTE — PROGRESS NOTES
MS: rhythm: SR/ST and First degree HB with PAC and occasional pauses 1.3 sec ot 1.45 and , HR , ND 0.26/QRS0.08/QT0.36

## 2020-03-09 NOTE — PROGRESS NOTES
Monitor tech notified pt experienced second degree type 1. Paged MD. Dionte, waiting for MD call back

## 2020-03-09 NOTE — CARE PLAN
Problem: Safety  Goal: Will remain free from injury  Intervention: Provide assistance with mobility  Note: Assisted pt up to bathroom, SBA with FWW, pt tolerated well , free from infury     Problem: Venous Thromboembolism (VTW)/Deep Vein Thrombosis (DVT) Prevention:  Goal: Patient will participate in Venous Thrombosis (VTE)/Deep Vein Thrombosis (DVT)Prevention Measures  Intervention: Ensure patient wears graduated elastic stockings (JAYLA hose) and/or SCDs, if ordered, when in bed or chair (Remove at least once per shift for skin check)  Flowsheets (Taken 3/9/2020 0800)  Mechanical Prophylaxis: SCDs, Sequential Compression Device  Note: Pt has active bleeding, SCD implemented for mechanical prophylaxis

## 2020-03-09 NOTE — CONSULTS
Reason for Consult:  Asked by Dr Precious Rapp M.D. to see this patient with pauses in heart rhythm  Patient's PCP: Pcp Not In Computer    CC:   Chief Complaint   Patient presents with   • Bloody Stools     Pt presents as [facility]hanfer from [facility]. Pt presented to the ED with complaitns of bloody stools following a colonoscopy. Pt Hgb was 12 on first assessment, followed by 9. Pt is currently receiving one unit of blood.        HPI: Very pleasant 80-year-old gentleman who presents with a history of GI bleeds recently had colonoscopy and apparently was commented he may have a AV block type II he has been evaluated here and underwent emergent embolization in this setting he has been on telemetry and was noted to have intermittent pauses in his heart beat up to 2.8 seconds on review of the telemetry strips these are blocked PACs with normal resumption of sinus rhythm after the pauses.  Previously had heart evaluation remotely in 2011 here at [facility] was normal he was taking antiplatelet    Medications / Drug list prior to admission:  No current facility-administered medications on file prior to encounter.      Current Outpatient Medications on File Prior to Encounter   Medication Sig Dispense Refill   • atorvastatin (LIPITOR) 10 MG Tab Take 10 mg by mouth every morning.     • clopidogrel (PLAVIX) 75 MG Tab Take  by mouth every day.     • Omega-3 Fatty Acids (FISH OIL) 1000 MG Cap capsule Take 2,000 mg by mouth every morning.     • CLINDAMYCIN HCL PO Take 1 Cap by mouth 4 times a day. 10 day course     • doxazosin (CARDURA) 2 MG TABS Take 2 mg by mouth every evening.         Current list of administered Medications:    Current Facility-Administered Medications:   •  atorvastatin (LIPITOR) tablet 10 mg, 10 mg, Oral, DAILY, Huber Del Valle M.D., 10 mg at 03/09/20 0406  •  doxazosin (CARDURA) tablet 2 mg, 2 mg, Oral, Q EVENING, Huber Del Valle M.D., Stopped at 03/07/20 1800  •  acetaminophen (TYLENOL) tablet 650 mg,  650 mg, Oral, Q6HRS PRN, Huber Del Valle M.D.  •  Notify provider if pain remains uncontrolled, , , CONTINUOUS **AND** Use the numeric rating scale (NRS-11) on regular floors and Critical-Care Pain Observation Tool (CPOT) on ICUs/Trauma to assess pain, , , CONTINUOUS **AND** Pulse Ox (Oximetry), , , CONTINUOUS **AND** Pharmacy Consult Request ...Pain Management Review 1 Each, 1 Each, Other, PHARMACY TO DOSE **AND** If patient difficult to arouse and/or has respiratory depression, stop any opiates that are currently infusing and call a Rapid Response., , , CONTINUOUS **AND** oxyCODONE immediate-release (ROXICODONE) tablet 2.5 mg, 2.5 mg, Oral, Q3HRS PRN **AND** oxyCODONE immediate-release (ROXICODONE) tablet 5 mg, 5 mg, Oral, Q3HRS PRN **AND** morphine (pf) 4 MG/ML injection 2 mg, 2 mg, Intravenous, Q3HRS PRN, Huber Del Valle M.D.  •  ondansetron (ZOFRAN) syringe/vial injection 4 mg, 4 mg, Intravenous, Q4HRS PRN, Huber Del Valle M.D.  •  ondansetron (ZOFRAN ODT) dispertab 4 mg, 4 mg, Oral, Q4HRS PRN, Huber Del Valel M.D.  •  senna-docusate (PERICOLACE or SENOKOT S) 8.6-50 MG per tablet 2 Tab, 2 Tab, Oral, BID, Stopped at 03/07/20 0600 **AND** polyethylene glycol/lytes (MIRALAX) PACKET 1 Packet, 1 Packet, Oral, QDAY PRN **AND** magnesium hydroxide (MILK OF MAGNESIA) suspension 30 mL, 30 mL, Oral, QDAY PRN **AND** bisacodyl (DULCOLAX) suppository 10 mg, 10 mg, Rectal, QDAY PRN, Huber Del Valle M.D.  •  lactated ringers infusion, , Intravenous, Continuous, Huber Del Valle M.D., Last Rate: 75 mL/hr at 03/09/20 0300    Past Medical History:   Diagnosis Date   • BPH (benign prostatic hyperplasia)    • Dyslipidemia    • Hypertension        Past Surgical History:   Procedure Laterality Date   • CATARACT PHACO WITH IOL  6/19/2012    Performed by ARIEL LUCIO at [facility]   • CATARACT PHACO WITH IOL  6/5/2012    Performed by ARIEL LUCIO at [facility]   • SHOULDER ARTHROPLASTY TOTAL          Family History   Problem Relation Age of Onset   • Cancer Mother    • Stroke Mother    • Cancer Father    • Arrythmia Father         pacemaker     Patient family history was personally reviewed, no pertinent family history to current presentation    Social History     Tobacco Use   • Smoking status: Never Smoker   • Smokeless tobacco: Never Used   Substance Use Topics   • Alcohol use: No   • Drug use: No       ALLERGIES:  Allergies   Allergen Reactions   • Asa [Aspirin] Unspecified     Gi bleed       Review of systems:  A complete review of symptoms was reviewed with patient. This is reviewed in H&P and PMH. ALL OTHERS reviewed and negative    Physical exam:  Patient Vitals for the past 24 hrs:   BP Temp Temp src Pulse Resp SpO2   03/09/20 1200 103/56 36.8 °C (98.2 °F) Temporal 67 16 96 %   03/09/20 1015 (!) 98/57 36.8 °C (98.2 °F) Temporal (!) 58 17 97 %   03/09/20 0945 (!) 99/55 36.8 °C (98.3 °F) Temporal (!) 59 17 95 %   03/09/20 0912 100/54 36.8 °C (98.2 °F) Temporal 64 18 95 %   03/09/20 0800 (!) 96/50 36.4 °C (97.6 °F) Temporal 65 18 97 %   03/09/20 0415 (!) 99/58 -- -- -- -- --   03/09/20 0400 (!) 96/51 37.2 °C (99 °F) Temporal 66 16 97 %   03/09/20 0030 109/67 36.9 °C (98.5 °F) -- 70 12 99 %   03/09/20 0000 106/65 36.9 °C (98.4 °F) -- 67 14 90 %   03/08/20 2315 131/79 37 °C (98.6 °F) -- 75 16 97 %   03/08/20 2302 108/64 36.9 °C (98.5 °F) -- 88 14 92 %   03/08/20 2000 101/57 37.2 °C (99 °F) Temporal 73 16 94 %   03/08/20 1837 (!) 97/64 -- -- -- -- --   03/08/20 1600 101/61 37.5 °C (99.5 °F) Temporal 87 16 98 %     General: No acute distress.   EYES: no jaundice  HEENT: OP clear   Neck: No bruits No JVD.   CVS:   RRR. S1 + S2. No M/R/G  Resp: CTAB. No wheezing or crackles/rhonchi.  Abdomen: Soft, NT, ND,  Skin: Grossly nothing acute no obvious rashes  Neurological: Alert, Moves all extremities, no cranial nerve defects on limited exam  Extremities: Pulse 2+ in b/l LE.  no edema. No cyanosis.        Data:  Laboratory studies personally reviewed by me:  Recent Results (from the past 24 hour(s))   HGB (Hemoglobin) for 48 hours    Collection Time: 20  1:58 PM   Result Value Ref Range    Hemoglobin 7.4 (L) 14.0 - 18.0 g/dL   HEMOGLOBIN AND HEMATOCRIT    Collection Time: 20  7:39 PM   Result Value Ref Range    Hemoglobin 6.9 (L) 14.0 - 18.0 g/dL    Hematocrit 20.6 (L) 42.0 - 52.0 %   HGB (Hemoglobin) for 48 hours    Collection Time: 20 10:15 PM   Result Value Ref Range    Hemoglobin 6.9 (L) 14.0 - 18.0 g/dL   HGB (Hemoglobin) for 48 hours    Collection Time: 20  5:36 AM   Result Value Ref Range    Hemoglobin 6.9 (L) 14.0 - 18.0 g/dL   HGB    Collection Time: 20  7:55 AM   Result Value Ref Range    Hemoglobin 7.0 (L) 14.0 - 18.0 g/dL   EKG    Collection Time: 20 12:24 PM   Result Value Ref Range    Report       [facility]    Test Date:  2020  Pt Name:    MIGUE OBREGON              Department: [facility]  MRN:        [id number]                      Room:       S180  Gender:     Male                         Technician: RUST  :        1940                   Requested By:KAMERON RUIZ  Order #:    [id number]                    Reading MD: Gaby Lieberman    Measurements  Intervals                                Axis  Rate:       55                           P:          -22  TX:         298                          QRS:        26  QRSD:       99                           T:          52  QT:         430  QTc:        412    Interpretive Statements  Probably wandering pacemaker with bradycardia  SINUS PAUSE  PROLONGED TX INTERVAL  Early R wave progression  No previous ECG available for comparison  Electronically Signed On 3-9-2020 12:34:42 PDT by Gaby Lieberman         Imaging:  IR-VISCERAL ANGIOGRAM - SELECTIVE   Final Result      Successful coil embolization of a peripheral branch of a RIGHT colic artery            NM-GI BLEEDING SCAN   Final Result      1.  Findings  are consistent with active GI bleeding within the large bowel at the level of the cecum.      These findings were discussed with nurse garrick on 03/07/2020.      OUTSIDE IMAGES-CT ABDOMEN /PELVIS   Final Result      EC-ECHOCARDIOGRAM COMPLETE W/O CONT    (Results Pending)           EKG : personally reviewed by me sinus rhythm with blocked PAC    Telemetry shows occasional blocked PAC he did have 2 PACs in a row which resulted in a 2.8 second pause but normal resumption of his sinus rhythm    All pertinent features of laboratory and imaging reviewed including primary images where applicable      Principal Problem:    GIB (gastrointestinal bleeding) POA: Unknown  Active Problems:    Essential hypertension POA: Unknown    Dyslipidemia POA: Unknown    S/P colonoscopic polypectomy POA: Unknown    History of CVA (cerebrovascular accident) POA: Unknown    Sinus bradycardia POA: Unknown    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 *      Assessment / Plan:  His pauses are blocked PACs no evidence of arrhythmia worse than this his recent dropped beats in the setting of colonoscopy likely reflect the same as in setting of severe illness requiring transfusion and urgent embolization he did not have any worse than this    We can arrange for a 2-week ZIO patch monitor  We will get an echocardiogram    He would like to establish with Dr. Cavazos in [place] for long-term follow-up    He can be discharged safely from a cardiovascular perspective    I personally discussed his case with  Dr Precious Rapp M.D.    No future appointments.    It is my pleasure to participate in the care of Mr. Draper.  Please do not hesitate to contact me with questions or concerns.    Brett Rudolph MD PhD [facility]  Cardiologist [facility]    3/9/2020    Please note that this dictation was created using voice  recognition software. I have worked with consultants from the vendor as well as technical experts from [facility] to optimize the interface. I have made every reasonable attempt to correct obvious errors, but I expect that there are errors of grammar and possibly content I did not discover before finalizing the note.

## 2020-03-09 NOTE — PROGRESS NOTES
Pt wants to take his doxazosin to help him urinating easily. However, his BP 97/64, will recheck pt;s BP and reschedule to med to 2000

## 2020-03-09 NOTE — PROGRESS NOTES
Hospital Medicine Daily Progress Note    Date of Service  3/9/2020    Chief Complaint  80 y.o. male admitted 3/7/2020 with GI bleed    Hospital Course    Patient is a 80-year-old male with prior history of stroke on antiplatelet therapy with Plavix who underwent for outpatient elective colonoscopy on 3/4/2020 done by Dr. Velásquez, and continued taking Plavix on subsequent following days admitted here for GI bleed and anemia.  Patient continued to have GI bleeding and hemoglobin drop from 9.9 on admission to 7.7 with hypotension and active GI bleed.  Patient required 1 unit of transfusion.  Patient underwent for emergency IR angiogram and underwent for embolization of SMA.  Patient has intermittent bleeding.  Patient has been seen by gastroenterologist in the hospital.  Plan is to continue monitoring hemoglobin and hematocrit and supportive care with transfusion.      Interval Problem Update  Patient had 6 some bloody stool yesterday.  Patient denies lightheadedness or dizziness.  Patient denies any chest pain.    Consultants/Specialty  Interventional radiology- Dr. Madera  Gastroenterology - Dr. Moise and Dr. Velásquez  Cardiology-Dr. Rudolph    Code Status  Full code    Disposition     Review of Systems  Review of Systems   Constitutional: Negative for chills and fever.   HENT: Negative for hearing loss and tinnitus.    Eyes: Negative for blurred vision, double vision and photophobia.   Cardiovascular: Negative for chest pain, palpitations and orthopnea.   Genitourinary: Negative for dysuria, frequency and urgency.   Skin: Negative for itching.   Neurological: Negative for dizziness and headaches.   All other systems reviewed and are negative.       Physical Exam  Temp:  [36.4 °C (97.6 °F)-37.5 °C (99.5 °F)] 36.8 °C (98.2 °F)  Pulse:  [58-88] 67  Resp:  [12-18] 16  BP: ()/(50-79) 103/56  SpO2:  [90 %-99 %] 96 %    Physical Exam  Constitutional:       General: He is not in acute distress.     Appearance: Normal  appearance. He is not ill-appearing, toxic-appearing or diaphoretic.   HENT:      Head: Normocephalic.      Nose: Nose normal. No congestion or rhinorrhea.      Mouth/Throat:      Pharynx: No oropharyngeal exudate or posterior oropharyngeal erythema.   Eyes:      General: No scleral icterus.     Pupils: Pupils are equal, round, and reactive to light.   Neck:      Musculoskeletal: Normal range of motion. No neck rigidity or muscular tenderness.      Vascular: No carotid bruit.   Cardiovascular:      Rate and Rhythm: Regular rhythm. Bradycardia present.      Heart sounds: No murmur. No friction rub.   Pulmonary:      Effort: Pulmonary effort is normal. No respiratory distress.      Breath sounds: Normal breath sounds. No stridor. No wheezing or rhonchi.   Abdominal:      General: Abdomen is flat. There is no distension.      Palpations: Abdomen is soft. There is no mass.      Tenderness: There is no abdominal tenderness.      Hernia: No hernia is present.   Musculoskeletal:         General: No swelling, tenderness, deformity or signs of injury.   Lymphadenopathy:      Cervical: No cervical adenopathy.   Skin:     General: Skin is warm.   Neurological:      General: No focal deficit present.      Mental Status: He is alert and oriented to person, place, and time. Mental status is at baseline.      Sensory: No sensory deficit.      Coordination: Coordination normal.       EKG-concerning for first-degree AV block    Fluids    Intake/Output Summary (Last 24 hours) at 3/9/2020 1340  Last data filed at 3/9/2020 1200  Gross per 24 hour   Intake 1425 ml   Output 2950 ml   Net -1525 ml       Laboratory  Recent Labs     03/07/20  0502  03/07/20  1950 03/08/20  0145  03/08/20  1939 03/08/20  2215 03/09/20  0536 03/09/20  0755   WBC 7.1  --   --  8.7  --   --   --   --   --    RBC 3.18*  --   --  2.30*  --   --   --   --   --    HEMOGLOBIN 9.9*   < > 7.4* 7.0*   < > 6.9* 6.9* 6.9* 7.0*   HEMATOCRIT 28.7*  --  21.7* 21.1*  --   20.6*  --   --   --    MCV 90.3  --   --  91.7  --   --   --   --   --    MCH 31.1  --   --  30.4  --   --   --   --   --    MCHC 34.5  --   --  33.2*  --   --   --   --   --    RDW 44.0  --   --  46.4  --   --   --   --   --    PLATELETCT 155*  --   --  149*  --   --   --   --   --    MPV 9.0  --   --  9.1  --   --   --   --   --     < > = values in this interval not displayed.     Recent Labs     03/07/20  0502 03/08/20  0145   SODIUM 135 138   POTASSIUM 4.6 4.3   CHLORIDE 106 109   CO2 20 23   GLUCOSE 112* 142*   BUN 20 22   CREATININE 0.78 0.75   CALCIUM 8.2* 7.8*                   Imaging  IR-VISCERAL ANGIOGRAM - SELECTIVE   Final Result      Successful coil embolization of a peripheral branch of a RIGHT colic artery            NM-GI BLEEDING SCAN   Final Result      1.  Findings are consistent with active GI bleeding within the large bowel at the level of the cecum.      These findings were discussed with nurse garrick on 03/07/2020.      OUTSIDE IMAGES-CT ABDOMEN /PELVIS   Final Result      EC-ECHOCARDIOGRAM COMPLETE W/O CONT    (Results Pending)        Assessment/Plan  * GIB (gastrointestinal bleeding)  Assessment & Plan  Patient is status post SMA embolization  Intermittent dark stool and some blood in the stool   Hemoglobin has been 6.9 in last 24-hour status post transfusion   Transfuse if hemoglobin less than 7 or hemodynamic instability    Sinus bradycardia  Assessment & Plan  First-degree AV block  Frequent pauses with 2.8 cm of pause on cardiac telemetry  Consulted cardiology and discussed with Dr. Rudolph.  Continue to monitor    History of CVA (cerebrovascular accident)  Assessment & Plan  Continue statin stop any further anticoagulation including Plavix for now till several weeks outpatient    BPH (benign prostatic hyperplasia)  Assessment & Plan  Stable on medical regimen.  Monitor.     S/P colonoscopic polypectomy  Assessment & Plan  Continue to have intermittent bleed.  Continue to have chronic  blood loss anemia.  Monitor hemoglobin and hematocrit supportive care and transfuse if hemoglobin is less than 7 or hemodynamic instability    Dyslipidemia  Assessment & Plan  Continue statin therapy.  Monitor.     Essential hypertension  Assessment & Plan  Patient is actually hypotensive after GI bleed and currently blood pressures are stable.  Avoid any blood pressure medications for now continue to monitor       VTE prophylaxis: SCDs only, pharmacological anticoagulation is contraindicated

## 2020-03-10 LAB
HCT VFR BLD AUTO: 22.5 % (ref 42–52)
HCT VFR BLD AUTO: 22.7 % (ref 42–52)
HCT VFR BLD AUTO: 23.6 % (ref 42–52)
HCT VFR BLD AUTO: 28 % (ref 42–52)
HGB BLD-MCNC: 7.7 G/DL (ref 14–18)
HGB BLD-MCNC: 7.7 G/DL (ref 14–18)
HGB BLD-MCNC: 7.8 G/DL (ref 14–18)
HGB BLD-MCNC: 9.1 G/DL (ref 14–18)

## 2020-03-10 PROCEDURE — 99232 SBSQ HOSP IP/OBS MODERATE 35: CPT | Performed by: INTERNAL MEDICINE

## 2020-03-10 PROCEDURE — 85018 HEMOGLOBIN: CPT | Mod: 91

## 2020-03-10 PROCEDURE — 85014 HEMATOCRIT: CPT | Mod: 91

## 2020-03-10 PROCEDURE — 700105 HCHG RX REV CODE 258: Performed by: HOSPITALIST

## 2020-03-10 PROCEDURE — A9270 NON-COVERED ITEM OR SERVICE: HCPCS | Performed by: HOSPITALIST

## 2020-03-10 PROCEDURE — 770020 HCHG ROOM/CARE - TELE (206)

## 2020-03-10 PROCEDURE — 700102 HCHG RX REV CODE 250 W/ 637 OVERRIDE(OP): Performed by: HOSPITALIST

## 2020-03-10 PROCEDURE — 36415 COLL VENOUS BLD VENIPUNCTURE: CPT

## 2020-03-10 RX ADMIN — ATORVASTATIN CALCIUM 10 MG: 10 TABLET, FILM COATED ORAL at 04:34

## 2020-03-10 RX ADMIN — SODIUM CHLORIDE, POTASSIUM CHLORIDE, SODIUM LACTATE AND CALCIUM CHLORIDE: 600; 310; 30; 20 INJECTION, SOLUTION INTRAVENOUS at 16:00

## 2020-03-10 RX ADMIN — SENNOSIDES AND DOCUSATE SODIUM 2 TABLET: 8.6; 5 TABLET ORAL at 04:34

## 2020-03-10 ASSESSMENT — ENCOUNTER SYMPTOMS
STRIDOR: 0
PHOTOPHOBIA: 0
MYALGIAS: 0
CHILLS: 0
BACK PAIN: 0
HEADACHES: 0
BLURRED VISION: 0
NECK PAIN: 0
SHORTNESS OF BREATH: 0
COUGH: 0
HEADACHES: 1
FEVER: 0
ORTHOPNEA: 0
DOUBLE VISION: 0
PALPITATIONS: 0
DIZZINESS: 0

## 2020-03-10 NOTE — CARE PLAN
Problem: Safety  Goal: Will remain free from injury  Intervention: Provide assistance with mobility  Note: Assisted pt up to bathroom, FWW in use, pt tolerated well, will ctm     Problem: Venous Thromboembolism (VTW)/Deep Vein Thrombosis (DVT) Prevention:  Goal: Patient will participate in Venous Thrombosis (VTE)/Deep Vein Thrombosis (DVT)Prevention Measures  Intervention: Ensure patient wears graduated elastic stockings (JAYLA hose) and/or SCDs, if ordered, when in bed or chair (Remove at least once per shift for skin check)  Flowsheets (Taken 3/10/2020 0800)  Mechanical Prophylaxis: SCDs, Sequential Compression Device  Note: SCD in use for mechanical prophylaxis

## 2020-03-10 NOTE — PROGRESS NOTES
Gastroenterology Progress Note     Author: Valeriy Velásquez M.D.   Date & Time Created: 3/10/2020 10:54 AM    Chief Complaint:  Gi bleeding    Interval History:  Patient with post-polypectomy bleeding from very large cecal polyp removal now s/p IR embolization with seeming control of bleeding. No hematochezia at this time. States BM's are still dark but no fresh blood. No abd pain. No cp or SOB    Review of Systems:  Review of Systems   Constitutional: Positive for malaise/fatigue. Negative for chills and fever.   Cardiovascular: Negative for chest pain.   Neurological: Positive for headaches.       Physical Exam:  Physical Exam  Constitutional:       Appearance: Normal appearance. He is not ill-appearing.   HENT:      Head: Normocephalic and atraumatic.      Mouth/Throat:      Mouth: Mucous membranes are moist.   Eyes:      General: No scleral icterus.     Extraocular Movements: Extraocular movements intact.   Cardiovascular:      Rate and Rhythm: Normal rate and regular rhythm.   Pulmonary:      Breath sounds: Normal breath sounds.   Abdominal:      General: Abdomen is flat.      Palpations: Abdomen is soft.      Tenderness: There is no guarding or rebound.   Musculoskeletal:         General: No swelling.   Skin:     General: Skin is warm and dry.   Neurological:      Mental Status: He is alert.   Psychiatric:         Mood and Affect: Mood normal.         Behavior: Behavior normal.         Labs:          Recent Labs     03/08/20  0145   SODIUM 138   POTASSIUM 4.3   CHLORIDE 109   CO2 23   BUN 22   CREATININE 0.75   CALCIUM 7.8*     Recent Labs     03/08/20  0145   GLUCOSE 142*     Recent Labs     03/08/20  0145  03/09/20  1948 03/10/20  0237 03/10/20  0820   RBC 2.30*  --   --   --   --    HEMOGLOBIN 7.0*   < > 7.7* 7.7* 7.7*   HEMATOCRIT 21.1*   < > 22.9* 22.5* 22.7*   PLATELETCT 149*  --   --   --   --     < > = values in this interval not displayed.     Recent Labs     03/08/20  0145   WBC 8.7   NEUTSPOLYS  80.20*   LYMPHOCYTES 12.30*   MONOCYTES 7.00   EOSINOPHILS 0.10   BASOPHILS 0.10     Hemodynamics:  Temp (24hrs), Av.5 °C (97.7 °F), Min:36.1 °C (97 °F), Max:36.8 °C (98.3 °F)  Temperature: 36.4 °C (97.5 °F)  Pulse  Av.8  Min: 53  Max: 97   Blood Pressure : 104/68     Respiratory:    Respiration: 20, Pulse Oximetry: 95 %        RUL Breath Sounds: Clear, RML Breath Sounds: Clear, RLL Breath Sounds: Diminished, PRABHU Breath Sounds: Clear, LLL Breath Sounds: Diminished  Fluids:    Intake/Output Summary (Last 24 hours) at 3/9/2020 1029  Last data filed at 3/9/2020 0800  Gross per 24 hour   Intake 1575 ml   Output 2050 ml   Net -475 ml        GI/Nutrition:  Orders Placed This Encounter   Procedures   • Diet Order Clear Liquid (no red)     Standing Status:   Standing     Number of Occurrences:   1     Order Specific Question:   Diet:     Answer:   Clear Liquid [10]     Comments:   no red     Medical Decision Making, by Problem:  Active Hospital Problems    Diagnosis   • *GIB (gastrointestinal bleeding) [K92.2]   • Essential hypertension [I10]   • Dyslipidemia [E78.5]   • S/P colonoscopic polypectomy [Z98.890]   • History of CVA (cerebrovascular accident) [Z86.73]     Impression and Plan    1) Lower GI bleeding - From postpolypectomy site. Now with some hypotension and concern for recurrent bleeding. Will check HgBs q6 after transfusion but I think he can advance diet to regular. Additional therapeutics appears unlikely. Will follow but he should be ok for d/c tomorrow from GI standpoint if no signs of active bleeding  2) Anemia, acute  3) Hypotension. Says his BP's tend to run low.  4) Anticoagulation therapy - hold anticoagulants for now.  5) Arrhythmia - cards work up ongoing.    Will follow    EMO      Quality-Core Measures

## 2020-03-10 NOTE — PROGRESS NOTES
Late entry: received a call from tele monitor that pt had 3.5 sec arrest and back to SR. Pt asymptomatic, declined chest pain, palpitation, headache, VS stable. MD. Joseph notified, no new order received, paged cardiologist md Rudolph

## 2020-03-10 NOTE — PROGRESS NOTES
Received a call from tele monitor, pt in 2nd degree type 2 HB this am. Pt declined chest pain, palpitation, VS taken and charted. MD. Joseph notified, no new order. Paged MD cardiologist Ronni, waiting for md call back

## 2020-03-10 NOTE — PROGRESS NOTES
SR with 1st degree and with periods of 2nd degree types I and II seen on monitor    Rate 59-70    AK .28, QRS .10, QTC .38     MD aware, cardiology has been consulted. Pt asymptomatic, SPB 90's-100's.

## 2020-03-10 NOTE — PROGRESS NOTES
MS: rhythm: SB/SR with PAC. PVC, first and second degree type 1, 1.3 - 2.83 seconds pauses, HR 50s-60s, VA 0.24/QRS0.10/QT0.40 per strip from monitor room

## 2020-03-10 NOTE — PROGRESS NOTES
[facility] Cardiology Follow-up Consult Note  Date of note:    3/10/2020          Patient ID:  Name:   Asa Draper     YOB: 1940  Age:   80 y.o.  male   MRN:   [id number]    Chief Complaint   Patient presents with   • Bloody Stools     Pt presents as trasnfer from [facility]. Pt presented to the ED with complaitns of bloody stools following a colonoscopy. Pt Hgb was 12 on first assessment, followed by 9. Pt is currently receiving one unit of blood.        Interim Events:  Patient seen with son at bedside still having blocked PACs pauses up to 3 seconds asymptomatic      ROS  No NV, No Bleeding, No dizziness   All other review of systems reviewed and negative.    Past medical, surgical, social, and family history reviewed and unchanged from admission except as noted in assessment and plan.    Medications: Reviewed in MAR  Current Facility-Administered Medications   Medication Dose Frequency Provider Last Rate Last Dose   • atorvastatin (LIPITOR) tablet 10 mg  10 mg DAILY Huber Del Valle M.D.   10 mg at 03/10/20 0434   • doxazosin (CARDURA) tablet 2 mg  2 mg Q EVENING Huber Del Valle M.D.   Stopped at 03/07/20 1800   • acetaminophen (TYLENOL) tablet 650 mg  650 mg Q6HRS PRN Huber Del Valle M.D.   650 mg at 03/09/20 2227   • Pharmacy Consult Request ...Pain Management Review 1 Each  1 Each PHARMACY TO DOSE Huber Del Valle M.D.        And   • oxyCODONE immediate-release (ROXICODONE) tablet 2.5 mg  2.5 mg Q3HRS PRN Huber Del Valle M.D.        And   • oxyCODONE immediate-release (ROXICODONE) tablet 5 mg  5 mg Q3HRS PRN Huber Del Valle M.D.        And   • morphine (pf) 4 MG/ML injection 2 mg  2 mg Q3HRS PRN Huber Del Valle M.D.       • ondansetron (ZOFRAN) syringe/vial injection 4 mg  4 mg Q4HRS PRN Huber Del Valle M.D.       • ondansetron (ZOFRAN ODT) dispertab 4 mg  4 mg Q4HRS PRN Huber Valerior, M.D.       • senna-docusate (PERICOLACE or SENOKOT S) 8.6-50 MG per tablet 2 Tab  2 Tab BID Huber Del Valle,  "M.D.   2 Tab at 03/10/20 0434    And   • polyethylene glycol/lytes (MIRALAX) PACKET 1 Packet  1 Packet QDAY PRN Huber Del Valle M.D.        And   • magnesium hydroxide (MILK OF MAGNESIA) suspension 30 mL  30 mL QDAY PRN Huber Del Valle M.D.        And   • bisacodyl (DULCOLAX) suppository 10 mg  10 mg QDAY PRN Huber Del Valle M.D.       • lactated ringers infusion   Continuous Huber Del Valle M.D. 75 mL/hr at 03/09/20 2200     Last reviewed on 3/7/2020  7:33 AM by Evi Amin, GlennT    Allergies   Allergen Reactions   • Asa [Aspirin] Unspecified     Gi bleed       Physical Exam  Body mass index is 30.07 kg/m². /52   Pulse 60   Temp 36.9 °C (98.5 °F) (Temporal)   Resp 18   Ht 1.702 m (5' 7\")   Wt 87.1 kg (192 lb 0.3 oz)   SpO2 94%    Vitals:    03/10/20 0400 03/10/20 0800 03/10/20 0951 03/10/20 1146   BP: 109/51 (!) 97/59 104/68 103/52   Pulse: (!) 59 (!) 53 72 60   Resp: 16 16 20 18   Temp: 36.1 °C (97 °F) 36.4 °C (97.5 °F) 36.4 °C (97.5 °F) 36.9 °C (98.5 °F)   TempSrc: Temporal Temporal Temporal Temporal   SpO2: 98% 97% 95% 94%   Weight:       Height:        Oxygen Therapy:  Pulse Oximetry: 94 %, O2 (LPM): 1, O2 Delivery Device: None - Room Air    General: no apparent distress  Eyes: nl conjunctiva  ENT: OP clear  Neck: no JVD   Lungs: normal respiratory effort, CTAB  Heart: RRR, no murmurs, no rubs or gallops,   EXT no edema bilateral lower extremities. + pedal pulses. no cyanosis  Abdomen: soft, non tender, non distended,  Neurological: No focal deficits  Psychiatric: Appropriate affect,   Skin: Warm extremities    Labs (personally reviewed and notable for):   Recent Results (from the past 24 hour(s))   EC-ECHOCARDIOGRAM COMPLETE W/O CONT    Collection Time: 03/09/20  4:52 PM   Result Value Ref Range    Eject.Frac. MOD BP 62.16     Eject.Frac. MOD 4C 60.43     Eject.Frac. MOD 2C 66.62     Left Ventrical Ejection Fraction 60    HEMOGLOBIN AND HEMATOCRIT    Collection Time: 03/09/20  7:48 PM   Result " Value Ref Range    Hemoglobin 7.7 (L) 14.0 - 18.0 g/dL    Hematocrit 22.9 (L) 42.0 - 52.0 %   HEMOGLOBIN AND HEMATOCRIT    Collection Time: 03/10/20  2:37 AM   Result Value Ref Range    Hemoglobin 7.7 (L) 14.0 - 18.0 g/dL    Hematocrit 22.5 (L) 42.0 - 52.0 %   HEMOGLOBIN AND HEMATOCRIT    Collection Time: 03/10/20  8:20 AM   Result Value Ref Range    Hemoglobin 7.7 (L) 14.0 - 18.0 g/dL    Hematocrit 22.7 (L) 42.0 - 52.0 %   HEMOGLOBIN AND HEMATOCRIT    Collection Time: 03/10/20 11:58 AM   Result Value Ref Range    Hemoglobin 7.8 (L) 14.0 - 18.0 g/dL    Hematocrit 23.6 (L) 42.0 - 52.0 %       Cardiac Imaging and Procedures Review:    EKG and telemetry tracings personally reviewed      Echocardiography Laboratory     CONCLUSIONS  Left ventricular ejection fraction is visually estimated to be 60%.  Mild concentric left ventricular hypertrophy.  Mild to moderately dilated right ventricle with borderline reduced   right ventricular systolic function.  Mildly dilated left atrium.  Mild mitral regurgitation.  Mild tricuspid regurgitation.  Estimated right ventricular systolic pressure  is 31 mmHg above RAP.  Inferior vena cava is not well visualized.'    Impression and Medical Decision Making:  Principal Problem:    GIB (gastrointestinal bleeding) POA: Unknown  Active Problems:    Essential hypertension POA: Unknown    Dyslipidemia POA: Unknown    S/P colonoscopic polypectomy POA: Unknown    History of CVA (cerebrovascular accident) POA: Unknown    Sinus bradycardia POA: Unknown    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 *    Block PACs  Most significant pauses we will arrange for outpatient 14-day ZIO patch monitor  Echocardiogram is reassuring    Plan reinforced with the patient and his son at the bedside they agreed to establish with local cardiology Dr. Cavazos    Cardiology will sign off certainly call for any  significant pauses over 5 seconds, overall unlikely to occur    I personally discussed his case with  Dr Vani Joseph M.D.    No future appointments.    It is my pleasure to participate in the care of Mr. Draper.  Please do not hesitate to contact me with questions or concerns.    Brett Rudolph MD PhD [facility]  Cardiologist [facility]    3/10/2020    Please note that this dictation was created using voice recognition software. I have worked with consultants from the vendor as well as technical experts from [facility] to optimize the interface. I have made every reasonable attempt to correct obvious errors, but I expect that there are errors of grammar and possibly content I did not discover before finalizing the note.

## 2020-03-10 NOTE — TELEPHONE ENCOUNTER
Patient Call   Brett Rudolph M.D.  You 17 hours ago (5:13 PM)      Please arrange a  zio patch - Thank you      Routing comment      Task deferred to schedulers to schedule testing.  Clarification relayed to MD on duration of testing.

## 2020-03-10 NOTE — CARE PLAN
Problem: Safety  Goal: Will remain free from injury  Outcome: PROGRESSING AS EXPECTED     Problem: Fluid Volume:  Goal: Will maintain balanced intake and output  Outcome: PROGRESSING AS EXPECTED     Problem: Pain Management  Goal: Pain level will decrease to patient's comfort goal  Outcome: PROGRESSING AS EXPECTED

## 2020-03-10 NOTE — PROGRESS NOTES
Received call from tele monitor, pt in 2nd degree type II heart block in rate of 40-55. Pt asymptomatic, SBP maintained at . MD notified, will continue to monitor

## 2020-03-10 NOTE — PROGRESS NOTES
Hospital Medicine Daily Progress Note    Date of Service  3/10/2020    Chief Complaint  80 y.o. male admitted 3/7/2020 with GI bleed    Hospital Course    Patient is a 80-year-old male with prior history of stroke on antiplatelet therapy with Plavix who underwent for outpatient elective colonoscopy on 3/4/2020 done by Dr. Velásquez, and continued taking Plavix on subsequent following days admitted here for GI bleed and anemia.  Patient continued to have GI bleeding and hemoglobin drop from 9.9 on admission to 7.7 with hypotension and active GI bleed.  Patient required 1 unit of transfusion.  Patient underwent for emergency IR angiogram and underwent for embolization of SMA.  Patient has intermittent bleeding.  Patient has been seen by gastroenterologist in the hospital.  Plan is to continue monitoring hemoglobin and hematocrit and supportive care with transfusion.      Interval Problem Update  Patient seen and examined, afebrile, no overnight events, denies any CP, no abdominal pain, no nausea or vomiting.    Patient denies lightheadedness or dizziness.   Having pauses and PAC, followed by cardiology appreciate rec.     Consultants/Specialty  Interventional radiology- Dr. Madera  Gastroenterology - Dr. Moise and Dr. Velásquez  Cardiology-Dr. Rudolph    Code Status  Full code    Disposition     Review of Systems  Review of Systems   Constitutional: Negative for chills and fever.   HENT: Negative for hearing loss and tinnitus.    Eyes: Negative for blurred vision, double vision and photophobia.   Respiratory: Negative for cough, shortness of breath and stridor.    Cardiovascular: Negative for chest pain, palpitations and orthopnea.   Genitourinary: Negative for dysuria, frequency and urgency.   Musculoskeletal: Negative for back pain, myalgias and neck pain.   Skin: Negative for itching.   Neurological: Negative for dizziness and headaches.   All other systems reviewed and are negative.       Physical Exam  Temp:  [36.1 °C  (97 °F)-36.9 °C (98.5 °F)] 36.9 °C (98.5 °F)  Pulse:  [53-72] 60  Resp:  [16-20] 18  BP: ()/(49-80) 103/52  SpO2:  [94 %-98 %] 94 %    Physical Exam  Constitutional:       General: He is not in acute distress.     Appearance: Normal appearance. He is not ill-appearing, toxic-appearing or diaphoretic.   HENT:      Head: Normocephalic.      Nose: Nose normal. No congestion or rhinorrhea.      Mouth/Throat:      Pharynx: No oropharyngeal exudate or posterior oropharyngeal erythema.   Eyes:      General: No scleral icterus.        Right eye: No discharge.         Left eye: No discharge.      Pupils: Pupils are equal, round, and reactive to light.   Neck:      Musculoskeletal: Normal range of motion. No neck rigidity or muscular tenderness.      Vascular: No carotid bruit.   Cardiovascular:      Rate and Rhythm: Regular rhythm. Bradycardia present.      Heart sounds: No murmur. No friction rub.   Pulmonary:      Effort: Pulmonary effort is normal. No respiratory distress.      Breath sounds: Normal breath sounds. No stridor. No wheezing or rhonchi.   Abdominal:      General: Abdomen is flat. There is no distension.      Palpations: Abdomen is soft.      Tenderness: There is no abdominal tenderness. There is no guarding or rebound.   Musculoskeletal:         General: No swelling, tenderness, deformity or signs of injury.   Lymphadenopathy:      Cervical: No cervical adenopathy.   Skin:     General: Skin is warm.   Neurological:      General: No focal deficit present.      Mental Status: He is alert and oriented to person, place, and time. Mental status is at baseline.      Sensory: No sensory deficit.      Coordination: Coordination normal.       EKG-concerning for first-degree AV block    Fluids    Intake/Output Summary (Last 24 hours) at 3/10/2020 1345  Last data filed at 3/10/2020 1000  Gross per 24 hour   Intake 6160 ml   Output 1650 ml   Net 4510 ml       Laboratory  Recent Labs     03/08/20  0141   03/10/20  0237 03/10/20  0820 03/10/20  1158   WBC 8.7  --   --   --   --    RBC 2.30*  --   --   --   --    HEMOGLOBIN 7.0*   < > 7.7* 7.7* 7.8*   HEMATOCRIT 21.1*   < > 22.5* 22.7* 23.6*   MCV 91.7  --   --   --   --    MCH 30.4  --   --   --   --    MCHC 33.2*  --   --   --   --    RDW 46.4  --   --   --   --    PLATELETCT 149*  --   --   --   --    MPV 9.1  --   --   --   --     < > = values in this interval not displayed.     Recent Labs     03/08/20  0145   SODIUM 138   POTASSIUM 4.3   CHLORIDE 109   CO2 23   GLUCOSE 142*   BUN 22   CREATININE 0.75   CALCIUM 7.8*                   Imaging  EC-ECHOCARDIOGRAM COMPLETE W/O CONT   Final Result      IR-VISCERAL ANGIOGRAM - SELECTIVE   Final Result      Successful coil embolization of a peripheral branch of a RIGHT colic artery            NM-GI BLEEDING SCAN   Final Result      1.  Findings are consistent with active GI bleeding within the large bowel at the level of the cecum.      These findings were discussed with nurse garrick on 03/07/2020.      OUTSIDE IMAGES-CT ABDOMEN /PELVIS   Final Result           Assessment/Plan  * GIB (gastrointestinal bleeding)  Assessment & Plan  Patient is status post SMA embolization  Intermittent dark stool   Hemoglobin now has been stable   Transfuse if hemoglobin less than 7 or hemodynamic instability  GI following appreciate rec.     Sinus bradycardia  Assessment & Plan  First-degree AV block  Frequent pauses with the highest 3.5 sec asymptomatic  Followed by cardiology appreciate rec.  Cardiology will arrange for zio patch as outpt     History of CVA (cerebrovascular accident)  Assessment & Plan  Continue statin stop any further anticoagulation including Plavix for now till several weeks outpatient    BPH (benign prostatic hyperplasia)  Assessment & Plan  Stable on medical regimen.  Monitor.     S/P colonoscopic polypectomy  Assessment & Plan  Continue to have intermittent bleed.  Continue to have chronic blood loss anemia.   Monitor hemoglobin and hematocrit supportive care and transfuse if hemoglobin is less than 7 or hemodynamic instability    Dyslipidemia  Assessment & Plan  Continue statin therapy.  Monitor.     Essential hypertension  Assessment & Plan  Patient is actually hypotensive after GI bleed and currently blood pressures are stable.  Avoid any blood pressure medications for now continue to monitor       VTE prophylaxis: SCDs only, pharmacological anticoagulation is contraindicated

## 2020-03-11 ENCOUNTER — PATIENT OUTREACH (OUTPATIENT)
Dept: HEALTH INFORMATION MANAGEMENT | Facility: OTHER | Age: 80
End: 2020-03-11

## 2020-03-11 LAB
ANION GAP SERPL CALC-SCNC: 2 MMOL/L (ref 0–11.9)
BUN SERPL-MCNC: 14 MG/DL (ref 8–22)
CALCIUM SERPL-MCNC: 7.4 MG/DL (ref 8.5–10.5)
CHLORIDE SERPL-SCNC: 109 MMOL/L (ref 96–112)
CO2 SERPL-SCNC: 27 MMOL/L (ref 20–33)
CREAT SERPL-MCNC: 0.66 MG/DL (ref 0.5–1.4)
ERYTHROCYTE [DISTWIDTH] IN BLOOD BY AUTOMATED COUNT: 49.5 FL (ref 35.9–50)
GLUCOSE SERPL-MCNC: 104 MG/DL (ref 65–99)
HCT VFR BLD AUTO: 22.3 % (ref 42–52)
HCT VFR BLD AUTO: 24 % (ref 42–52)
HGB BLD-MCNC: 7.7 G/DL (ref 14–18)
HGB BLD-MCNC: 8.2 G/DL (ref 14–18)
MCH RBC QN AUTO: 30.4 PG (ref 27–33)
MCHC RBC AUTO-ENTMCNC: 34.5 G/DL (ref 33.7–35.3)
MCV RBC AUTO: 88.1 FL (ref 81.4–97.8)
PLATELET # BLD AUTO: 147 K/UL (ref 164–446)
PMV BLD AUTO: 8.8 FL (ref 9–12.9)
POTASSIUM SERPL-SCNC: 3.7 MMOL/L (ref 3.6–5.5)
RBC # BLD AUTO: 2.53 M/UL (ref 4.7–6.1)
SODIUM SERPL-SCNC: 138 MMOL/L (ref 135–145)
WBC # BLD AUTO: 6.6 K/UL (ref 4.8–10.8)

## 2020-03-11 PROCEDURE — 85027 COMPLETE CBC AUTOMATED: CPT

## 2020-03-11 PROCEDURE — 36415 COLL VENOUS BLD VENIPUNCTURE: CPT

## 2020-03-11 PROCEDURE — 700105 HCHG RX REV CODE 258: Performed by: INTERNAL MEDICINE

## 2020-03-11 PROCEDURE — 99232 SBSQ HOSP IP/OBS MODERATE 35: CPT | Performed by: INTERNAL MEDICINE

## 2020-03-11 PROCEDURE — 85014 HEMATOCRIT: CPT

## 2020-03-11 PROCEDURE — 80048 BASIC METABOLIC PNL TOTAL CA: CPT

## 2020-03-11 PROCEDURE — A9270 NON-COVERED ITEM OR SERVICE: HCPCS | Performed by: HOSPITALIST

## 2020-03-11 PROCEDURE — 700102 HCHG RX REV CODE 250 W/ 637 OVERRIDE(OP): Performed by: HOSPITALIST

## 2020-03-11 PROCEDURE — 51798 US URINE CAPACITY MEASURE: CPT

## 2020-03-11 PROCEDURE — 700105 HCHG RX REV CODE 258: Performed by: HOSPITALIST

## 2020-03-11 PROCEDURE — 770020 HCHG ROOM/CARE - TELE (206)

## 2020-03-11 PROCEDURE — 85018 HEMOGLOBIN: CPT

## 2020-03-11 RX ORDER — CLOPIDOGREL BISULFATE 75 MG/1
75 TABLET ORAL DAILY
Qty: 30 TAB | Refills: 0 | Status: SHIPPED | OUTPATIENT
Start: 2020-03-19

## 2020-03-11 RX ORDER — SODIUM CHLORIDE, SODIUM LACTATE, POTASSIUM CHLORIDE, AND CALCIUM CHLORIDE .6; .31; .03; .02 G/100ML; G/100ML; G/100ML; G/100ML
500 INJECTION, SOLUTION INTRAVENOUS ONCE
Status: COMPLETED | OUTPATIENT
Start: 2020-03-11 | End: 2020-03-11

## 2020-03-11 RX ADMIN — SENNOSIDES AND DOCUSATE SODIUM 2 TABLET: 8.6; 5 TABLET ORAL at 05:38

## 2020-03-11 RX ADMIN — SODIUM CHLORIDE, POTASSIUM CHLORIDE, SODIUM LACTATE AND CALCIUM CHLORIDE 500 ML: 600; 310; 30; 20 INJECTION, SOLUTION INTRAVENOUS at 13:46

## 2020-03-11 RX ADMIN — SODIUM CHLORIDE, POTASSIUM CHLORIDE, SODIUM LACTATE AND CALCIUM CHLORIDE: 600; 310; 30; 20 INJECTION, SOLUTION INTRAVENOUS at 08:19

## 2020-03-11 RX ADMIN — DOXAZOSIN 2 MG: 2 TABLET ORAL at 17:02

## 2020-03-11 RX ADMIN — ATORVASTATIN CALCIUM 10 MG: 10 TABLET, FILM COATED ORAL at 05:38

## 2020-03-11 RX ADMIN — SODIUM CHLORIDE, POTASSIUM CHLORIDE, SODIUM LACTATE AND CALCIUM CHLORIDE: 600; 310; 30; 20 INJECTION, SOLUTION INTRAVENOUS at 17:21

## 2020-03-11 RX ADMIN — SENNOSIDES AND DOCUSATE SODIUM 2 TABLET: 8.6; 5 TABLET ORAL at 17:02

## 2020-03-11 RX ADMIN — ACETAMINOPHEN 650 MG: 325 TABLET, FILM COATED ORAL at 19:44

## 2020-03-11 RX ADMIN — OXYCODONE HYDROCHLORIDE 5 MG: 5 TABLET ORAL at 22:45

## 2020-03-11 ASSESSMENT — ENCOUNTER SYMPTOMS
PHOTOPHOBIA: 0
DIZZINESS: 0
MYALGIAS: 0
COUGH: 0
PALPITATIONS: 0
DOUBLE VISION: 0
NECK PAIN: 0
STRIDOR: 0
HEADACHES: 0
ORTHOPNEA: 0
CHILLS: 0
BLURRED VISION: 0
FEVER: 0
SHORTNESS OF BREATH: 0
BLOOD IN STOOL: 0
BACK PAIN: 0

## 2020-03-11 NOTE — DOCUMENTATION QUERY
[facility]                                                                       Query Response Note      PATIENT:               MIGUE OBREGON  ACCT #:                  [id number]  MRN:                     [id number]  :                      1940  ADMIT DATE:       3/7/2020 4:45 AM  DISCH DATE:          RESPONDING  PROVIDER #:        [id number]           QUERY TEXT:    Please clarify the relationship, if any, between GI bleed and Plavix.     NOTE:  If an appropriate response is not listed below, please respond with a new note.        The patient's Clinical Indicators include:  GI bleed, anemia  3/8 hgb 6.9, hct 20.6  Risk Factors: Plavix  Treatment: embolization of SMA, transfusion of PRBC's, serial h/h, stop Plavix  Options provided:   -- GI bleed is due to/associated with hemorrhagic disorder due to circulating anticoagulants   -- GI bleed is not due to/associated with hemorrhagic disorder due to circulating anticoagulants   -- Unable to determine      Query created by: Lucas Liu on 3/11/2020 1:19 PM    RESPONSE TEXT:    Unable to determine          Electronically signed by:  NIGEL ESPARZA MD 3/11/2020 1:54 PM

## 2020-03-11 NOTE — PROGRESS NOTES
Pt and son educated on home care instructions, diet, activity, medications, follow up, seeking medical attention. Verbalized understanding. Pt up getting dressed and reports feels dizzy. Seated in chair and BP 63/37 HR 72. Dr. Joseph notified. In to see patient. Discharge postponed, fluid bolus ordered. Pt no longer feeling dizzy.

## 2020-03-11 NOTE — PROGRESS NOTES
Monitor Summary: SR-SB, 2nd degree type I/II HR 59-77, with rare PVC's, block PAC's 1.6-2.5 second sinus arrest, DE 0.30, QRS 0.08, QT 0.40 per strip from monitor room.

## 2020-03-11 NOTE — PROGRESS NOTES
MS: rhythm: SB/SR/2nd degree type I/II with rare PVCs, block PACs, 3.5 sec sinus arrest, HR 50s-60s, VA 0.30/QRS0.10/QT0.42 per strip from monitor room

## 2020-03-11 NOTE — DISCHARGE INSTRUCTIONS
Discharge Instructions    Discharged to home by car with relative. Discharged via wheelchair, hospital escort: Yes.  Special equipment needed: Not Applicable    Be sure to schedule a follow-up appointment with your primary care doctor or any specialists as instructed.     Discharge Plan:   Diet Plan: Discussed  Activity Level: Discussed  Confirmed Follow up Appointment: Patient to Call and Schedule Appointment  Confirmed Symptoms Management: Discussed  Medication Reconciliation Updated: Yes    I understand that a diet low in cholesterol, fat, and sodium is recommended for good health. Unless I have been given specific instructions below for another diet, I accept this instruction as my diet prescription.         · Is patient discharged on Warfarin / Coumadin?   No     Depression / Suicide Risk    As you are discharged from this [facility] facility, it is important to learn how to keep safe from harming yourself.    Recognize the warning signs:  · Abrupt changes in personality, positive or negative- including increase in energy   · Giving away possessions  · Change in eating patterns- significant weight changes-  positive or negative  · Change in sleeping patterns- unable to sleep or sleeping all the time   · Unwillingness or inability to communicate  · Depression  · Unusual sadness, discouragement and loneliness  · Talk of wanting to die  · Neglect of personal appearance   · Rebelliousness- reckless behavior  · Withdrawal from people/activities they love  · Confusion- inability to concentrate     If you or a loved one observes any of these behaviors or has concerns about self-harm, here's what you can do:  · Talk about it- your feelings and reasons for harming yourself  · Remove any means that you might use to hurt yourself (examples: pills, rope, extension cords, firearm)  · Get professional help from the community (Mental Health, Substance Abuse, psychological counseling)  · Do not be alone:Call your Safe  Contact- someone whom you trust who will be there for you.  · Call your local CRISIS HOTLINE 050-5348 or 211-791-3902  · Call your local Children's Mobile Crisis Response Team [place] (620) 215-2359 or www.[employer]lick  · Call the toll free National Suicide Prevention Hotlines   · National Suicide Prevention Lifeline 159-256-OKFG (5487)  · National Hope Line Network 800-SUICIDE (551-5994)        Gastrointestinal Bleeding  Introduction  Gastrointestinal bleeding is bleeding somewhere along the path food travels through the body (digestive tract). This path is anywhere between the mouth and the opening of the butt (anus). You may have blood in your poop (stools) or have black poop. If you throw up (vomit), there may be blood in it.  This condition can be mild, serious, or even life-threatening. If you have a lot of bleeding, you may need to stay in the hospital.  Follow these instructions at home:  · Take over-the-counter and prescription medicines only as told by your doctor.  · Eat foods that have a lot of fiber in them. These foods include whole grains, fruits, and vegetables. You can also try eating 1-3 prunes each day.  · Drink enough fluid to keep your pee (urine) clear or pale yellow.  · Keep all follow-up visits as told by your doctor. This is important.  Contact a doctor if:  · Your symptoms do not get better.  Get help right away if:  · Your bleeding gets worse.  · You feel dizzy or you pass out (faint).  · You feel weak.  · You have very bad cramps in your back or belly (abdomen).  · You pass large clumps of blood (clots) in your poop.  · Your symptoms are getting worse.  This information is not intended to replace advice given to you by your health care provider. Make sure you discuss any questions you have with your health care provider.  Document Released: 09/26/2009 Document Revised: 05/25/2017 Document Reviewed: 06/06/2016  © 2017 Elsevier

## 2020-03-11 NOTE — PROGRESS NOTES
Gastroenterology Progress Note     Author: Valeriy Velásquez M.D.   Date & Time Created: 3/11/2020 9:52 AM    Chief Complaint:  Gi bleeding    Interval History:  Patient with post-polypectomy bleeding from very large cecal polyp removal now s/p IR embolization with control of bleeding. No hematochezia at this time. States BM's are still dark but no fresh blood. No abd pain. No cp or SOB. Wants to go home    Review of Systems:  Review of Systems   Constitutional: Positive for malaise/fatigue. Negative for chills and fever.   Cardiovascular: Negative for chest pain.   Gastrointestinal: Negative for blood in stool.       Physical Exam:  Physical Exam  Constitutional:       Appearance: Normal appearance. He is not ill-appearing.   HENT:      Head: Normocephalic and atraumatic.   Eyes:      General: No scleral icterus.     Extraocular Movements: Extraocular movements intact.   Abdominal:      General: Abdomen is flat.      Palpations: Abdomen is soft.      Tenderness: There is no guarding or rebound.   Musculoskeletal:         General: No swelling.   Skin:     General: Skin is warm and dry.   Neurological:      Mental Status: He is alert.   Psychiatric:         Mood and Affect: Mood normal.         Behavior: Behavior normal.         Labs:          Recent Labs     20  0012   SODIUM 138   POTASSIUM 3.7   CHLORIDE 109   CO2 27   BUN 14   CREATININE 0.66   CALCIUM 7.4*     Recent Labs     20  0012   GLUCOSE 104*     Recent Labs     03/10/20  1758 20  0012 20  0604   RBC  --  2.53*  --    HEMOGLOBIN 9.1* 7.7* 8.2*   HEMATOCRIT 28.0* 22.3* 24.0*   PLATELETCT  --  147*  --      Recent Labs     20  0012   WBC 6.6     Hemodynamics:  Temp (24hrs), Av.7 °C (98 °F), Min:36.1 °C (97 °F), Max:37.1 °C (98.8 °F)  Temperature: 36.1 °C (97 °F)  Pulse  Av.7  Min: 53  Max: 97   Blood Pressure : 104/51     Respiratory:    Respiration: 16, Pulse Oximetry: 96 %        RUL Breath Sounds: Clear, RML Breath  Sounds: Clear, RLL Breath Sounds: Diminished, PRABHU Breath Sounds: Clear, LLL Breath Sounds: Diminished  Fluids:    Intake/Output Summary (Last 24 hours) at 3/9/2020 1029  Last data filed at 3/9/2020 0800  Gross per 24 hour   Intake 1575 ml   Output 2050 ml   Net -475 ml        GI/Nutrition:  Orders Placed This Encounter   Procedures   • Diet Order Cardiac     Standing Status:   Standing     Number of Occurrences:   1     Order Specific Question:   Diet:     Answer:   Cardiac [6]     Medical Decision Making, by Problem:  Active Hospital Problems    Diagnosis   • *GIB (gastrointestinal bleeding) [K92.2]   • Essential hypertension [I10]   • Dyslipidemia [E78.5]   • S/P colonoscopic polypectomy [Z98.890]   • History of CVA (cerebrovascular accident) [Z86.73]     Impression and Plan    1) Lower GI bleeding - From postpolypectomy site. No further bleeding. Ok to go home  2) Anemia, acute  3) Hypotension. Says his BP's tend to run low.  4) Anticoagulation therapy - hold anticoagulants for nowCan resume Plavix OR asa in a week  5) Arrhythmia - cards work up ongoing.    Will sign off. I will arrange for follow up colonoscopy in 6 months and will inform him of this. No need for follow up with us in the clinic.    EMO      Quality-Core Measures

## 2020-03-11 NOTE — PROGRESS NOTES
Hospital Medicine Daily Progress Note    Date of Service  3/11/2020    Chief Complaint  80 y.o. male admitted 3/7/2020 with GI bleed    Hospital Course    Patient is a 80-year-old male with prior history of stroke on antiplatelet therapy with Plavix who underwent for outpatient elective colonoscopy on 3/4/2020 done by Dr. Velásquez, and continued taking Plavix on subsequent following days admitted here for GI bleed and anemia.  Patient continued to have GI bleeding and hemoglobin drop from 9.9 on admission to 7.7 with hypotension and active GI bleed.  Patient required 1 unit of transfusion.  Patient underwent for emergency IR angiogram and underwent for embolization of SMA.  Patient has intermittent bleeding.  Patient has been seen by gastroenterologist in the hospital.  Plan is to continue monitoring hemoglobin and hematocrit and supportive care with transfusion.      Interval Problem Update  Patient seen and examined, afebrile, no overnight events, was going to discharge him this morning but when pt stood up he was feeling light headed and his SBP was 65.  So started some IVF for hydration.  BP improving   His hemoglobin has been stable      Consultants/Specialty  Interventional radiology- Dr. Madera  Gastroenterology - Dr. Moise and Dr. Velásquez  Cardiology-Dr. Rudolph    Code Status  Full code    Disposition     Review of Systems  Review of Systems   Constitutional: Negative for chills and fever.   HENT: Negative for hearing loss and tinnitus.    Eyes: Negative for blurred vision, double vision and photophobia.   Respiratory: Negative for cough, shortness of breath and stridor.    Cardiovascular: Negative for chest pain, palpitations and orthopnea.   Genitourinary: Negative for dysuria, frequency and urgency.   Musculoskeletal: Negative for back pain, myalgias and neck pain.   Skin: Negative for itching.   Neurological: Negative for dizziness and headaches.   All other systems reviewed and are negative.        Physical Exam  Temp:  [36.1 °C (97 °F)-37.1 °C (98.8 °F)] 36.1 °C (97 °F)  Pulse:  [62-83] 64  Resp:  [16-18] 16  BP: ()/(37-61) 113/60  SpO2:  [91 %-97 %] 96 %    Physical Exam  Constitutional:       General: He is not in acute distress.     Appearance: Normal appearance. He is not ill-appearing, toxic-appearing or diaphoretic.   HENT:      Head: Normocephalic.      Nose: Nose normal. No congestion or rhinorrhea.      Mouth/Throat:      Pharynx: No oropharyngeal exudate or posterior oropharyngeal erythema.   Eyes:      General: No scleral icterus.        Right eye: No discharge.         Left eye: No discharge.      Pupils: Pupils are equal, round, and reactive to light.   Neck:      Musculoskeletal: Normal range of motion. No neck rigidity or muscular tenderness.      Vascular: No carotid bruit.   Cardiovascular:      Rate and Rhythm: Regular rhythm. Bradycardia present.      Heart sounds: No murmur. No friction rub.   Pulmonary:      Effort: Pulmonary effort is normal. No respiratory distress.      Breath sounds: Normal breath sounds. No stridor. No wheezing or rhonchi.   Abdominal:      General: Abdomen is flat. There is no distension.      Palpations: Abdomen is soft.      Tenderness: There is no abdominal tenderness. There is no guarding or rebound.   Musculoskeletal:         General: No swelling, tenderness, deformity or signs of injury.   Lymphadenopathy:      Cervical: No cervical adenopathy.   Skin:     General: Skin is warm.   Neurological:      General: No focal deficit present.      Mental Status: He is alert and oriented to person, place, and time. Mental status is at baseline.      Sensory: No sensory deficit.      Coordination: Coordination normal.       EKG-concerning for first-degree AV block    Fluids    Intake/Output Summary (Last 24 hours) at 3/11/2020 1357  Last data filed at 3/11/2020 0820  Gross per 24 hour   Intake 890 ml   Output 950 ml   Net -60 ml       Laboratory  Recent Labs      03/10/20  1758 03/11/20  0012 03/11/20  0604   WBC  --  6.6  --    RBC  --  2.53*  --    HEMOGLOBIN 9.1* 7.7* 8.2*   HEMATOCRIT 28.0* 22.3* 24.0*   MCV  --  88.1  --    MCH  --  30.4  --    MCHC  --  34.5  --    RDW  --  49.5  --    PLATELETCT  --  147*  --    MPV  --  8.8*  --      Recent Labs     03/11/20  0012   SODIUM 138   POTASSIUM 3.7   CHLORIDE 109   CO2 27   GLUCOSE 104*   BUN 14   CREATININE 0.66   CALCIUM 7.4*                   Imaging  EC-ECHOCARDIOGRAM COMPLETE W/O CONT   Final Result      IR-VISCERAL ANGIOGRAM - SELECTIVE   Final Result      Successful coil embolization of a peripheral branch of a RIGHT colic artery            NM-GI BLEEDING SCAN   Final Result      1.  Findings are consistent with active GI bleeding within the large bowel at the level of the cecum.      These findings were discussed with nurse mccauley on 03/07/2020.      OUTSIDE IMAGES-CT ABDOMEN /PELVIS   Final Result           Assessment/Plan  * GIB (gastrointestinal bleeding)  Assessment & Plan  Patient is status post SMA embolization  Intermittent dark stool   Hemoglobin now has been stable   Transfuse if hemoglobin less than 7 or hemodynamic instability  GI signed off and will need colonoscopy as oupt.      Sinus bradycardia  Assessment & Plan  First-degree AV block  Frequent pauses with the highest 3.5 sec asymptomatic  Followed by cardiology appreciate rec.  Cardiology will arrange for zio patch as outpt     History of CVA (cerebrovascular accident)  Assessment & Plan  Continue statin stop any further anticoagulation including Plavix for now till several weeks outpatient    BPH (benign prostatic hyperplasia)  Assessment & Plan  Stable on medical regimen.  Monitor.     S/P colonoscopic polypectomy  Assessment & Plan  Continue to have intermittent bleed.  Continue to have chronic blood loss anemia.  Monitor hemoglobin and hematocrit supportive care and transfuse if hemoglobin is less than 7 or hemodynamic  instability    Dyslipidemia  Assessment & Plan  Continue statin therapy.  Monitor.     Essential hypertension  Assessment & Plan  Pt was hypotensive this AM with systolic in the 60s   Started on IVF and also giving a 500 cc bolus  Now slowly improving        VTE prophylaxis: SCDs only, pharmacological anticoagulation is contraindicated

## 2020-03-12 VITALS
HEIGHT: 67 IN | HEART RATE: 57 BPM | TEMPERATURE: 98.9 F | RESPIRATION RATE: 16 BRPM | SYSTOLIC BLOOD PRESSURE: 122 MMHG | BODY MASS INDEX: 30.14 KG/M2 | DIASTOLIC BLOOD PRESSURE: 66 MMHG | WEIGHT: 192.02 LBS | OXYGEN SATURATION: 96 %

## 2020-03-12 LAB
ANION GAP SERPL CALC-SCNC: 10 MMOL/L (ref 7–16)
BUN SERPL-MCNC: 10 MG/DL (ref 8–22)
CALCIUM SERPL-MCNC: 7.9 MG/DL (ref 8.4–10.2)
CHLORIDE SERPL-SCNC: 107 MMOL/L (ref 96–112)
CO2 SERPL-SCNC: 26 MMOL/L (ref 20–33)
CREAT SERPL-MCNC: 0.65 MG/DL (ref 0.5–1.4)
ERYTHROCYTE [DISTWIDTH] IN BLOOD BY AUTOMATED COUNT: 51.4 FL (ref 35.9–50)
GLUCOSE SERPL-MCNC: 98 MG/DL (ref 65–99)
HCT VFR BLD AUTO: 25 % (ref 42–52)
HGB BLD-MCNC: 8.2 G/DL (ref 14–18)
MCH RBC QN AUTO: 29.9 PG (ref 27–33)
MCHC RBC AUTO-ENTMCNC: 32.8 G/DL (ref 33.7–35.3)
MCV RBC AUTO: 91.2 FL (ref 81.4–97.8)
PLATELET # BLD AUTO: 185 K/UL (ref 164–446)
PMV BLD AUTO: 8.6 FL (ref 9–12.9)
POTASSIUM SERPL-SCNC: 3.9 MMOL/L (ref 3.6–5.5)
RBC # BLD AUTO: 2.74 M/UL (ref 4.7–6.1)
SODIUM SERPL-SCNC: 143 MMOL/L (ref 135–145)
WBC # BLD AUTO: 7.4 K/UL (ref 4.8–10.8)

## 2020-03-12 PROCEDURE — 99239 HOSP IP/OBS DSCHRG MGMT >30: CPT | Performed by: INTERNAL MEDICINE

## 2020-03-12 PROCEDURE — 85027 COMPLETE CBC AUTOMATED: CPT

## 2020-03-12 PROCEDURE — 700102 HCHG RX REV CODE 250 W/ 637 OVERRIDE(OP): Performed by: HOSPITALIST

## 2020-03-12 PROCEDURE — 700105 HCHG RX REV CODE 258: Performed by: HOSPITALIST

## 2020-03-12 PROCEDURE — 80048 BASIC METABOLIC PNL TOTAL CA: CPT

## 2020-03-12 PROCEDURE — A9270 NON-COVERED ITEM OR SERVICE: HCPCS | Performed by: HOSPITALIST

## 2020-03-12 PROCEDURE — 36415 COLL VENOUS BLD VENIPUNCTURE: CPT

## 2020-03-12 RX ADMIN — ATORVASTATIN CALCIUM 10 MG: 10 TABLET, FILM COATED ORAL at 04:27

## 2020-03-12 RX ADMIN — ACETAMINOPHEN 650 MG: 325 TABLET, FILM COATED ORAL at 04:26

## 2020-03-12 RX ADMIN — SODIUM CHLORIDE, POTASSIUM CHLORIDE, SODIUM LACTATE AND CALCIUM CHLORIDE: 600; 310; 30; 20 INJECTION, SOLUTION INTRAVENOUS at 07:00

## 2020-03-12 NOTE — PROGRESS NOTES
Received calls from monitor room regarding patient's pauses and HR. Longest pause = 2.3 seconds. Patient is asymptomatic at this time, VSS: 107/54, HR 66, O2 93% on room air. Denies CP, HA, light headedness, dizziness, etc. Will continue to monitor.

## 2020-03-12 NOTE — PROGRESS NOTES
Pt voided approx 100 mL, c/o pain. Bladder scan for >999. Straight cath for 1100 mL. Pt tolerated well, no longer c/o pain.

## 2020-03-12 NOTE — CARE PLAN
Problem: Safety  Goal: Will remain free from injury  3/12/2020 1000 by Amy Peterson R.N.  Outcome: PROGRESSING AS EXPECTED  3/12/2020 0959 by Amy Peterson R.N.  Outcome: PROGRESSING AS EXPECTED     Problem: Urinary Elimination:  Goal: Ability to reestablish a normal urinary elimination pattern will improve  3/12/2020 1000 by Amy Peterson R.N.  Outcome: PROGRESSING AS EXPECTED  3/12/2020 0959 by Amy Peterson R.N.  Outcome: PROGRESSING AS EXPECTED

## 2020-03-12 NOTE — PROGRESS NOTES
Monitor summary: SR/ST , MN 0.30, QRS 0.08, QT 0.40, with occasional trigeminal PVCs and couplet PVCs, and pauses between 1.4-2.3 seconds per strip from monitor room.

## 2020-03-12 NOTE — PROGRESS NOTES
Monitor summary: SR 68-90, KY .26, QRS .10, QT .38, 1.7 SP, B-PAC, rare PVC per strip from monitor room.

## 2020-03-12 NOTE — PROGRESS NOTES
Patient's IV and Telemetry removed and discharge instructions given. Patient discharged home with son via wheelchair.

## 2020-03-12 NOTE — CARE PLAN
Problem: Communication  Goal: The ability to communicate needs accurately and effectively will improve  Outcome: PROGRESSING AS EXPECTED     Problem: Safety  Goal: Will remain free from injury  Outcome: PROGRESSING AS EXPECTED     Problem: Urinary Elimination:  Goal: Ability to reestablish a normal urinary elimination pattern will improve  Outcome: PROGRESSING SLOWER THAN EXPECTED  Note: Hartmann removed this AM. Patient having dysuria and still retaining. Straight cathed x1 this shift. Home medication regimen resumed. Monitoring I/Os. Updated the patient on POC, all questions answered.

## 2020-03-12 NOTE — DISCHARGE SUMMARY
Discharge Summary    CHIEF COMPLAINT ON ADMISSION  Chief Complaint   Patient presents with   • Bloody Stools     Pt presents as trasnfer from [facility]. Pt presented to the ED with complaitns of bloody stools following a colonoscopy. Pt Hgb was 12 on first assessment, followed by 9. Pt is currently receiving one unit of blood.        Reason for Admission  EMS     Admission Date  3/7/2020    CODE STATUS  Prior    HPI & HOSPITAL COURSE   Patient is a 80-year-old male with prior history of stroke on antiplatelet therapy with Plavix who underwent for outpatient elective colonoscopy on 3/4/2020 done by Dr. Velásquez, and continued taking Plavix on subsequent following days admitted here for GI bleed and anemia.  Patient continued to have GI bleeding and hemoglobin drop from 9.9 on admission to 7.7 with hypotension and active GI bleed.  Patient required 1 unit of transfusion.  Patient underwent for emergency IR angiogram and underwent for embolization of SMA. His hemoglobin stabilized after it. Patient was seen by gastroenterologist in the hospital. Bleeding thought to be from post polypectomy site. Pt is now doing better, his BP has normalized. Per GI he will need to follow up as outpt for another colonoscopy in 6 months. GI also recommended that pt do not start back in Plavix for another week.   Pt while inpatient was having a lot of PAC and pauses so cardiology was consulted and evaluated pt. Per cardiology pt will need a zio patch as outpt and they will arrange for it.  Pt feeling well today. He will be discharged home.     The patient met 2-midnight criteria for an inpatient stay at the time of discharge.    Discharge Date  3/12/2020    FOLLOW UP ITEMS POST DISCHARGE  GI  Cardiology   PCP     DISCHARGE DIAGNOSES  Principal Problem:    GIB (gastrointestinal bleeding) POA: Unknown  Active Problems:    Essential hypertension POA: Unknown    Dyslipidemia POA: Unknown    S/P colonoscopic polypectomy POA: Unknown     History of CVA (cerebrovascular accident) POA: Unknown    Sinus bradycardia POA: Unknown    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 *      FOLLOW UP  No future appointments.  [facility]  [address] [place]   [place] [id number]  [phone]  Call in 1 day  Please call the following number to schedule an appt to get established with a Primary Care Provider. Thank you    [facility]  [address] [facility] [id number] [phone]          MEDICATIONS ON DISCHARGE     Medication List      CHANGE how you take these medications      Instructions   clopidogrel 75 MG Tabs  Start taking on:  March 19, 2020  What changed:    · how much to take  · These instructions start on March 19, 2020. If you are unsure what to do until then, ask your doctor or other care provider.  Commonly known as:  PLAVIX   Take 1 Tab by mouth every day.  Dose:  75 mg        CONTINUE taking these medications      Instructions   atorvastatin 10 MG Tabs  Commonly known as:  LIPITOR   Take 10 mg by mouth every morning.  Dose:  10 mg     doxazosin 2 MG Tabs  Commonly known as:  CARDURA   Take 2 mg by mouth every evening.  Dose:  2 mg     fish oil 1000 MG Caps capsule   Take 2,000 mg by mouth every morning.  Dose:  2,000 mg        STOP taking these medications    CLINDAMYCIN HCL PO            Allergies  Allergies   Allergen Reactions   • Asa [Aspirin] Unspecified     Gi bleed       DIET  No orders of the defined types were placed in this encounter.      ACTIVITY  As tolerated.  Weight bearing as tolerated    CONSULTATIONS  GI  Cardiology   IR    PROCEDURES  arteriogram with coil embolization of distal RIGHT colic branch of SMA    LABORATORY  Lab Results   Component Value Date    SODIUM 143 03/12/2020    POTASSIUM 3.9 03/12/2020    CHLORIDE 107 03/12/2020    CO2 26 03/12/2020    GLUCOSE 98 03/12/2020    BUN 10 03/12/2020    CREATININE 0.65  03/12/2020        Lab Results   Component Value Date    WBC 7.4 03/12/2020    HEMOGLOBIN 8.2 (L) 03/12/2020    HEMATOCRIT 25.0 (L) 03/12/2020    PLATELETCT 185 03/12/2020        Total time of the discharge process exceeds 43 minutes.

## 2020-03-16 NOTE — DOCUMENTATION QUERY
[facility]                                                                       Query Response Note      PATIENT:               MIGUE OBREGON  ACCT #:                  [id number]  MRN:                     [id number]  :                      1940  ADMIT DATE:       3/7/2020 4:45 AM  DISCH DATE:        3/12/2020 12:08 PM  RESPONDING  PROVIDER #:        [id number]           QUERY TEXT:    Acute anemia is documented in the Medical Record. Please specify the underlying cause of the anemia.    NOTE:  If the appropriate response is not listed below, please respond with a new note.              The patient's Clinical Indicators include:  per 3/9 GI Progress Note: Lower GI bleeding, acute anemia  3/8 hgb 6.9, 20.6  Risk Factors: lower GI bleeding, s/p polypectomy,   Treatment: transfusion PRBC's  Options provided:   -- Acute blood loss anemia   -- Other type of anemia, please specify   -- Unable to determine      Query created by: Lucas Liu on 3/10/2020 8:45 AM    RESPONSE TEXT:    Acute blood loss anemia          Electronically signed by:  NIGEL ESPARZA MD 3/16/2020 3:13 PM

## 2020-03-17 ENCOUNTER — TELEPHONE (OUTPATIENT)
Dept: CARDIOLOGY | Facility: MEDICAL CENTER | Age: 80
End: 2020-03-17

## 2020-03-17 NOTE — TELEPHONE ENCOUNTER
CW/courtney    Pt calling to discuss zio patch that CW wants pt to have (refer to notes of 3/10)  Pt also want to report he is going to be using a cardiologist in [place] due to the distance he would have to come to get to [facility].    Please call Asa to discuss .

## 2020-03-18 NOTE — TELEPHONE ENCOUNTER
"Returned pt call and reviewed concerns.  Pt states, \"I got a letter after the hospital.  I spoke with Dr. Rudolph and he said he would send a heart monitor to carry for awhile and mail back.  From what I was told, I don't have anything serious.  I had a colonoscopy before and they found I had something like a 3rd degree block and my heart skipped a beat.  They kept an eye on me for a week and they must have saw something.  I will see my cardiologist in [place] at the end of the month and I would like to be followed by my [place] doc.  I do not want to drive 80 miles out if I do not need to.\"  Pt states he would like to talk to his cardiologist before proceeding with MD recommendations.  He states no other concerns or questions at this time and is appreciative of information given.      Would like to speak  in [place].      "